# Patient Record
Sex: FEMALE | Race: WHITE | Employment: FULL TIME | URBAN - METROPOLITAN AREA
[De-identification: names, ages, dates, MRNs, and addresses within clinical notes are randomized per-mention and may not be internally consistent; named-entity substitution may affect disease eponyms.]

---

## 2017-02-16 ENCOUNTER — HOSPITAL ENCOUNTER (OUTPATIENT)
Dept: CT IMAGING | Age: 57
Discharge: HOME | End: 2017-02-16
Attending: FAMILY MEDICINE
Payer: COMMERCIAL

## 2017-02-16 DIAGNOSIS — R19.03 RIGHT LOWER QUADRANT ABDOMINAL MASS: ICD-10-CM

## 2017-02-16 PROCEDURE — 74011000258 HC RX REV CODE- 258: Performed by: FAMILY MEDICINE

## 2017-02-16 PROCEDURE — 74011636320 HC RX REV CODE- 636/320: Performed by: FAMILY MEDICINE

## 2017-02-16 PROCEDURE — 74177 CT ABD & PELVIS W/CONTRAST: CPT

## 2017-02-16 RX ORDER — SODIUM CHLORIDE 0.9 % (FLUSH) 0.9 %
10 SYRINGE (ML) INJECTION
Status: COMPLETED | OUTPATIENT
Start: 2017-02-16 | End: 2017-02-16

## 2017-02-16 RX ADMIN — DIATRIZOATE MEGLUMINE AND DIATRIZOATE SODIUM 15 ML: 600; 100 SOLUTION ORAL; RECTAL at 14:00

## 2017-02-16 RX ADMIN — IOVERSOL 100 ML: 741 INJECTION INTRA-ARTERIAL; INTRAVENOUS at 14:00

## 2017-02-16 RX ADMIN — SODIUM CHLORIDE 100 ML: 900 INJECTION, SOLUTION INTRAVENOUS at 14:00

## 2017-02-16 RX ADMIN — Medication 10 ML: at 14:00

## 2017-02-17 NOTE — PROGRESS NOTES
Reported to patient. Likely benign mass. Needs surgical consult. In significant discomfort, so it needs to be SOON.  Has seen Nayeli Ibanez in the past.

## 2017-02-21 PROBLEM — R19.03 ABDOMINAL MASS, RLQ (RIGHT LOWER QUADRANT): Status: ACTIVE | Noted: 2017-02-21

## 2017-02-23 ENCOUNTER — HOSPITAL ENCOUNTER (OUTPATIENT)
Dept: SURGERY | Age: 57
Discharge: HOME | End: 2017-02-23
Payer: COMMERCIAL

## 2017-02-23 VITALS
RESPIRATION RATE: 17 BRPM | DIASTOLIC BLOOD PRESSURE: 61 MMHG | HEART RATE: 65 BPM | TEMPERATURE: 98.5 F | OXYGEN SATURATION: 98 % | SYSTOLIC BLOOD PRESSURE: 111 MMHG | WEIGHT: 127 LBS | BODY MASS INDEX: 21.68 KG/M2 | HEIGHT: 64 IN

## 2017-02-23 LAB
ANION GAP BLD CALC-SCNC: 10 MMOL/L (ref 7–16)
APPEARANCE UR: CLEAR
APTT PPP: 26.4 SEC (ref 23.5–31.7)
BILIRUB UR QL: NEGATIVE
BUN SERPL-MCNC: 15 MG/DL (ref 6–23)
CALCIUM SERPL-MCNC: 9 MG/DL (ref 8.3–10.4)
CHLORIDE SERPL-SCNC: 102 MMOL/L (ref 98–107)
CO2 SERPL-SCNC: 29 MMOL/L (ref 21–32)
COLOR UR: YELLOW
CREAT SERPL-MCNC: 0.75 MG/DL (ref 0.6–1)
ERYTHROCYTE [DISTWIDTH] IN BLOOD BY AUTOMATED COUNT: 12.4 % (ref 11.9–14.6)
GLUCOSE SERPL-MCNC: 64 MG/DL (ref 65–100)
GLUCOSE UR STRIP.AUTO-MCNC: NEGATIVE MG/DL
HCT VFR BLD AUTO: 40.3 % (ref 35.8–46.3)
HGB BLD-MCNC: 13.4 G/DL (ref 11.7–15.4)
HGB UR QL STRIP: NEGATIVE
INR PPP: 1 (ref 0.9–1.2)
KETONES UR QL STRIP.AUTO: NEGATIVE MG/DL
LEUKOCYTE ESTERASE UR QL STRIP.AUTO: NEGATIVE
MCH RBC QN AUTO: 29.8 PG (ref 26.1–32.9)
MCHC RBC AUTO-ENTMCNC: 33.3 G/DL (ref 31.4–35)
MCV RBC AUTO: 89.8 FL (ref 79.6–97.8)
NITRITE UR QL STRIP.AUTO: NEGATIVE
PH UR STRIP: 5.5 [PH] (ref 5–9)
PLATELET # BLD AUTO: 213 K/UL (ref 150–450)
PMV BLD AUTO: 10.1 FL (ref 10.8–14.1)
POTASSIUM SERPL-SCNC: 3.8 MMOL/L (ref 3.5–5.1)
PROT UR STRIP-MCNC: NEGATIVE MG/DL
PROTHROMBIN TIME: 10.4 SEC (ref 9.6–12)
RBC # BLD AUTO: 4.49 M/UL (ref 4.05–5.25)
SODIUM SERPL-SCNC: 141 MMOL/L (ref 136–145)
SP GR UR REFRACTOMETRY: 1.01 (ref 1–1.02)
UROBILINOGEN UR QL STRIP.AUTO: 0.2 EU/DL (ref 0.2–1)
WBC # BLD AUTO: 4.8 K/UL (ref 4.3–11.1)

## 2017-02-23 PROCEDURE — 85730 THROMBOPLASTIN TIME PARTIAL: CPT | Performed by: SURGERY

## 2017-02-23 PROCEDURE — 85027 COMPLETE CBC AUTOMATED: CPT | Performed by: SURGERY

## 2017-02-23 PROCEDURE — 81003 URINALYSIS AUTO W/O SCOPE: CPT | Performed by: SURGERY

## 2017-02-23 PROCEDURE — 85610 PROTHROMBIN TIME: CPT | Performed by: SURGERY

## 2017-02-23 PROCEDURE — 80048 BASIC METABOLIC PNL TOTAL CA: CPT | Performed by: SURGERY

## 2017-02-23 NOTE — PERIOP NOTES
Patient verified name, , and surgery as listed in [facility]. TYPE  CASE:II  Orders per surgeon:  Received  Labs per surgeon: CBC, BMP, PT, PTT; UA  Labs per anesthesia protocol:   none  EKG  :  NA      Patient provided with handouts including guide to surgery , transfusions, pain management and hand hygiene for the family and community. Pt verbalizes understanding of all pre-op instructions . Instructed that family must be present in building at all times. Hibiclens and instructions given per hospital policy. Instructed patient to continue  previous medications as prescribed prior to surgery and  to take the following medications the day of surgery according to anesthesia guidelines : none       Original medication prescription bottles not visualized during patient appointment. Continue all previous medications unless otherwise directed. Instructed patient to hold  the following medications prior to surgery: All supplements- see MAR      Patient verbalized understanding of all instructions and provided all medical/health information to the best of their ability.

## 2017-02-26 ENCOUNTER — ANESTHESIA EVENT (OUTPATIENT)
Dept: SURGERY | Age: 57
DRG: 355 | End: 2017-02-26
Payer: COMMERCIAL

## 2017-02-27 ENCOUNTER — HOSPITAL ENCOUNTER (INPATIENT)
Age: 57
LOS: 2 days | Discharge: HOME | DRG: 355 | End: 2017-03-01
Attending: SURGERY | Admitting: SURGERY
Payer: COMMERCIAL

## 2017-02-27 ENCOUNTER — ANESTHESIA (OUTPATIENT)
Dept: SURGERY | Age: 57
DRG: 355 | End: 2017-02-27
Payer: COMMERCIAL

## 2017-02-27 ENCOUNTER — SURGERY (OUTPATIENT)
Age: 57
End: 2017-02-27

## 2017-02-27 PROBLEM — R19.00 ABDOMINAL MASS: Status: ACTIVE | Noted: 2017-02-27

## 2017-02-27 LAB
ABO + RH BLD: NORMAL
BLOOD GROUP ANTIBODIES SERPL: NORMAL
HCG UR QL: NEGATIVE
SPECIMEN EXP DATE BLD: NORMAL

## 2017-02-27 PROCEDURE — 77030002996 HC SUT SLK J&J -A: Performed by: SURGERY

## 2017-02-27 PROCEDURE — 76210000001 HC OR PH I REC 2.5 TO 3 HR: Performed by: SURGERY

## 2017-02-27 PROCEDURE — 77030011264 HC ELECTRD BLD EXT COVD -A: Performed by: SURGERY

## 2017-02-27 PROCEDURE — 74011250637 HC RX REV CODE- 250/637: Performed by: SURGERY

## 2017-02-27 PROCEDURE — 88305 TISSUE EXAM BY PATHOLOGIST: CPT | Performed by: SURGERY

## 2017-02-27 PROCEDURE — 77030011640 HC PAD GRND REM COVD -A: Performed by: SURGERY

## 2017-02-27 PROCEDURE — 0WBF0ZZ EXCISION OF ABDOMINAL WALL, OPEN APPROACH: ICD-10-PCS | Performed by: SURGERY

## 2017-02-27 PROCEDURE — 0WQF0ZZ REPAIR ABDOMINAL WALL, OPEN APPROACH: ICD-10-PCS | Performed by: SURGERY

## 2017-02-27 PROCEDURE — 76010000162 HC OR TIME 1.5 TO 2 HR INTENSV-TIER 1: Performed by: SURGERY

## 2017-02-27 PROCEDURE — 76060000034 HC ANESTHESIA 1.5 TO 2 HR: Performed by: SURGERY

## 2017-02-27 PROCEDURE — 74011250636 HC RX REV CODE- 250/636

## 2017-02-27 PROCEDURE — 74011000250 HC RX REV CODE- 250

## 2017-02-27 PROCEDURE — 74011250636 HC RX REV CODE- 250/636: Performed by: SURGERY

## 2017-02-27 PROCEDURE — 77030032490 HC SLV COMPR SCD KNE COVD -B: Performed by: SURGERY

## 2017-02-27 PROCEDURE — 81025 URINE PREGNANCY TEST: CPT

## 2017-02-27 PROCEDURE — 77030008467 HC STPLR SKN COVD -B: Performed by: SURGERY

## 2017-02-27 PROCEDURE — 77030018836 HC SOL IRR NACL ICUM -A: Performed by: SURGERY

## 2017-02-27 PROCEDURE — 65270000029 HC RM PRIVATE

## 2017-02-27 PROCEDURE — 74011250637 HC RX REV CODE- 250/637: Performed by: ANESTHESIOLOGY

## 2017-02-27 PROCEDURE — 77030002966 HC SUT PDS J&J -A: Performed by: SURGERY

## 2017-02-27 PROCEDURE — 77030034850: Performed by: SURGERY

## 2017-02-27 PROCEDURE — 86900 BLOOD TYPING SEROLOGIC ABO: CPT | Performed by: ANESTHESIOLOGY

## 2017-02-27 PROCEDURE — 74011250636 HC RX REV CODE- 250/636: Performed by: ANESTHESIOLOGY

## 2017-02-27 PROCEDURE — 77030003029 HC SUT VCRL J&J -B: Performed by: SURGERY

## 2017-02-27 RX ORDER — SODIUM CHLORIDE, SODIUM LACTATE, POTASSIUM CHLORIDE, CALCIUM CHLORIDE 600; 310; 30; 20 MG/100ML; MG/100ML; MG/100ML; MG/100ML
100 INJECTION, SOLUTION INTRAVENOUS CONTINUOUS
Status: DISCONTINUED | OUTPATIENT
Start: 2017-02-27 | End: 2017-02-27 | Stop reason: HOSPADM

## 2017-02-27 RX ORDER — LIDOCAINE HYDROCHLORIDE 20 MG/ML
INJECTION, SOLUTION EPIDURAL; INFILTRATION; INTRACAUDAL; PERINEURAL AS NEEDED
Status: DISCONTINUED | OUTPATIENT
Start: 2017-02-27 | End: 2017-02-27 | Stop reason: HOSPADM

## 2017-02-27 RX ORDER — SODIUM CHLORIDE 0.9 % (FLUSH) 0.9 %
5-10 SYRINGE (ML) INJECTION AS NEEDED
Status: DISCONTINUED | OUTPATIENT
Start: 2017-02-27 | End: 2017-03-01 | Stop reason: HOSPADM

## 2017-02-27 RX ORDER — HYDROMORPHONE HYDROCHLORIDE 2 MG/ML
INJECTION, SOLUTION INTRAMUSCULAR; INTRAVENOUS; SUBCUTANEOUS AS NEEDED
Status: DISCONTINUED | OUTPATIENT
Start: 2017-02-27 | End: 2017-02-27 | Stop reason: HOSPADM

## 2017-02-27 RX ORDER — FENTANYL CITRATE 50 UG/ML
25 INJECTION, SOLUTION INTRAMUSCULAR; INTRAVENOUS ONCE
Status: DISCONTINUED | OUTPATIENT
Start: 2017-02-27 | End: 2017-02-27 | Stop reason: HOSPADM

## 2017-02-27 RX ORDER — HYDROMORPHONE HYDROCHLORIDE 2 MG/ML
0.5 INJECTION, SOLUTION INTRAMUSCULAR; INTRAVENOUS; SUBCUTANEOUS
Status: DISCONTINUED | OUTPATIENT
Start: 2017-02-27 | End: 2017-02-27 | Stop reason: HOSPADM

## 2017-02-27 RX ORDER — ZOLPIDEM TARTRATE 5 MG/1
5 TABLET ORAL
Status: DISCONTINUED | OUTPATIENT
Start: 2017-02-27 | End: 2017-03-01 | Stop reason: HOSPADM

## 2017-02-27 RX ORDER — ENOXAPARIN SODIUM 100 MG/ML
40 INJECTION SUBCUTANEOUS EVERY 24 HOURS
Status: DISCONTINUED | OUTPATIENT
Start: 2017-02-28 | End: 2017-03-01 | Stop reason: HOSPADM

## 2017-02-27 RX ORDER — ONDANSETRON 2 MG/ML
INJECTION INTRAMUSCULAR; INTRAVENOUS AS NEEDED
Status: DISCONTINUED | OUTPATIENT
Start: 2017-02-27 | End: 2017-02-27 | Stop reason: HOSPADM

## 2017-02-27 RX ORDER — SODIUM CHLORIDE 0.9 % (FLUSH) 0.9 %
5-10 SYRINGE (ML) INJECTION AS NEEDED
Status: DISCONTINUED | OUTPATIENT
Start: 2017-02-27 | End: 2017-02-27 | Stop reason: HOSPADM

## 2017-02-27 RX ORDER — SODIUM CHLORIDE 0.9 % (FLUSH) 0.9 %
5-10 SYRINGE (ML) INJECTION EVERY 8 HOURS
Status: DISCONTINUED | OUTPATIENT
Start: 2017-02-27 | End: 2017-02-27 | Stop reason: HOSPADM

## 2017-02-27 RX ORDER — ONDANSETRON 2 MG/ML
4 INJECTION INTRAMUSCULAR; INTRAVENOUS
Status: DISCONTINUED | OUTPATIENT
Start: 2017-02-27 | End: 2017-03-01 | Stop reason: HOSPADM

## 2017-02-27 RX ORDER — GLYCOPYRROLATE 0.2 MG/ML
INJECTION INTRAMUSCULAR; INTRAVENOUS AS NEEDED
Status: DISCONTINUED | OUTPATIENT
Start: 2017-02-27 | End: 2017-02-27 | Stop reason: HOSPADM

## 2017-02-27 RX ORDER — PROPOFOL 10 MG/ML
INJECTION, EMULSION INTRAVENOUS AS NEEDED
Status: DISCONTINUED | OUTPATIENT
Start: 2017-02-27 | End: 2017-02-27 | Stop reason: HOSPADM

## 2017-02-27 RX ORDER — HYDROMORPHONE HYDROCHLORIDE 1 MG/ML
1 INJECTION, SOLUTION INTRAMUSCULAR; INTRAVENOUS; SUBCUTANEOUS
Status: DISCONTINUED | OUTPATIENT
Start: 2017-02-27 | End: 2017-03-01 | Stop reason: HOSPADM

## 2017-02-27 RX ORDER — ROCURONIUM BROMIDE 10 MG/ML
INJECTION, SOLUTION INTRAVENOUS AS NEEDED
Status: DISCONTINUED | OUTPATIENT
Start: 2017-02-27 | End: 2017-02-27 | Stop reason: HOSPADM

## 2017-02-27 RX ORDER — SODIUM CHLORIDE, SODIUM LACTATE, POTASSIUM CHLORIDE, CALCIUM CHLORIDE 600; 310; 30; 20 MG/100ML; MG/100ML; MG/100ML; MG/100ML
INJECTION, SOLUTION INTRAVENOUS
Status: DISCONTINUED | OUTPATIENT
Start: 2017-02-27 | End: 2017-02-27 | Stop reason: HOSPADM

## 2017-02-27 RX ORDER — LIDOCAINE HYDROCHLORIDE 10 MG/ML
0.1 INJECTION INFILTRATION; PERINEURAL AS NEEDED
Status: DISCONTINUED | OUTPATIENT
Start: 2017-02-27 | End: 2017-02-27 | Stop reason: HOSPADM

## 2017-02-27 RX ORDER — MIDAZOLAM HYDROCHLORIDE 1 MG/ML
2 INJECTION, SOLUTION INTRAMUSCULAR; INTRAVENOUS
Status: DISCONTINUED | OUTPATIENT
Start: 2017-02-27 | End: 2017-02-27 | Stop reason: HOSPADM

## 2017-02-27 RX ORDER — ATORVASTATIN CALCIUM 10 MG/1
10 TABLET, FILM COATED ORAL
Status: DISCONTINUED | OUTPATIENT
Start: 2017-02-27 | End: 2017-03-01 | Stop reason: HOSPADM

## 2017-02-27 RX ORDER — ESCITALOPRAM OXALATE 10 MG/1
20 TABLET ORAL DAILY
Status: DISCONTINUED | OUTPATIENT
Start: 2017-02-28 | End: 2017-03-01 | Stop reason: HOSPADM

## 2017-02-27 RX ORDER — DEXAMETHASONE SODIUM PHOSPHATE 4 MG/ML
INJECTION, SOLUTION INTRA-ARTICULAR; INTRALESIONAL; INTRAMUSCULAR; INTRAVENOUS; SOFT TISSUE AS NEEDED
Status: DISCONTINUED | OUTPATIENT
Start: 2017-02-27 | End: 2017-02-27 | Stop reason: HOSPADM

## 2017-02-27 RX ORDER — OXYCODONE HYDROCHLORIDE 5 MG/1
5 TABLET ORAL
Status: DISCONTINUED | OUTPATIENT
Start: 2017-02-27 | End: 2017-02-27 | Stop reason: HOSPADM

## 2017-02-27 RX ORDER — SODIUM CHLORIDE 0.9 % (FLUSH) 0.9 %
5-10 SYRINGE (ML) INJECTION EVERY 8 HOURS
Status: DISCONTINUED | OUTPATIENT
Start: 2017-02-27 | End: 2017-03-01 | Stop reason: HOSPADM

## 2017-02-27 RX ORDER — NEOSTIGMINE METHYLSULFATE 1 MG/ML
INJECTION INTRAVENOUS AS NEEDED
Status: DISCONTINUED | OUTPATIENT
Start: 2017-02-27 | End: 2017-02-27 | Stop reason: HOSPADM

## 2017-02-27 RX ORDER — OXYCODONE AND ACETAMINOPHEN 5; 325 MG/1; MG/1
1 TABLET ORAL AS NEEDED
Status: DISCONTINUED | OUTPATIENT
Start: 2017-02-27 | End: 2017-02-27 | Stop reason: HOSPADM

## 2017-02-27 RX ORDER — CEFAZOLIN SODIUM IN 0.9 % NACL 2 G/50 ML
2 INTRAVENOUS SOLUTION, PIGGYBACK (ML) INTRAVENOUS EVERY 8 HOURS
Status: COMPLETED | OUTPATIENT
Start: 2017-02-27 | End: 2017-02-28

## 2017-02-27 RX ORDER — DOCUSATE SODIUM 100 MG/1
100 CAPSULE, LIQUID FILLED ORAL 2 TIMES DAILY
Status: DISCONTINUED | OUTPATIENT
Start: 2017-02-27 | End: 2017-03-01 | Stop reason: HOSPADM

## 2017-02-27 RX ORDER — MIDAZOLAM HYDROCHLORIDE 1 MG/ML
2 INJECTION, SOLUTION INTRAMUSCULAR; INTRAVENOUS ONCE
Status: DISCONTINUED | OUTPATIENT
Start: 2017-02-27 | End: 2017-02-27 | Stop reason: HOSPADM

## 2017-02-27 RX ORDER — SODIUM CHLORIDE 9 MG/ML
50 INJECTION, SOLUTION INTRAVENOUS CONTINUOUS
Status: DISCONTINUED | OUTPATIENT
Start: 2017-02-27 | End: 2017-02-27 | Stop reason: HOSPADM

## 2017-02-27 RX ORDER — FAMOTIDINE 20 MG/1
20 TABLET, FILM COATED ORAL ONCE
Status: COMPLETED | OUTPATIENT
Start: 2017-02-27 | End: 2017-02-27

## 2017-02-27 RX ORDER — HYDROCODONE BITARTRATE AND ACETAMINOPHEN 7.5; 325 MG/1; MG/1
1 TABLET ORAL
Status: DISCONTINUED | OUTPATIENT
Start: 2017-02-27 | End: 2017-03-01 | Stop reason: HOSPADM

## 2017-02-27 RX ORDER — CEFAZOLIN SODIUM IN 0.9 % NACL 2 G/50 ML
2 INTRAVENOUS SOLUTION, PIGGYBACK (ML) INTRAVENOUS ONCE
Status: COMPLETED | OUTPATIENT
Start: 2017-02-27 | End: 2017-02-27

## 2017-02-27 RX ORDER — SODIUM CHLORIDE, SODIUM LACTATE, POTASSIUM CHLORIDE, CALCIUM CHLORIDE 600; 310; 30; 20 MG/100ML; MG/100ML; MG/100ML; MG/100ML
25 INJECTION, SOLUTION INTRAVENOUS CONTINUOUS
Status: DISPENSED | OUTPATIENT
Start: 2017-02-27 | End: 2017-02-28

## 2017-02-27 RX ORDER — DIPHENHYDRAMINE HYDROCHLORIDE 50 MG/ML
12.5 INJECTION, SOLUTION INTRAMUSCULAR; INTRAVENOUS ONCE
Status: DISCONTINUED | OUTPATIENT
Start: 2017-02-27 | End: 2017-02-27 | Stop reason: HOSPADM

## 2017-02-27 RX ADMIN — HYDROMORPHONE HYDROCHLORIDE 1 MG: 1 INJECTION, SOLUTION INTRAMUSCULAR; INTRAVENOUS; SUBCUTANEOUS at 20:49

## 2017-02-27 RX ADMIN — DEXAMETHASONE SODIUM PHOSPHATE 4 MG: 4 INJECTION, SOLUTION INTRA-ARTICULAR; INTRALESIONAL; INTRAMUSCULAR; INTRAVENOUS; SOFT TISSUE at 13:37

## 2017-02-27 RX ADMIN — SODIUM CHLORIDE, SODIUM LACTATE, POTASSIUM CHLORIDE, AND CALCIUM CHLORIDE 75 ML/HR: 600; 310; 30; 20 INJECTION, SOLUTION INTRAVENOUS at 18:00

## 2017-02-27 RX ADMIN — CEFAZOLIN 2 G: 1 INJECTION, POWDER, FOR SOLUTION INTRAMUSCULAR; INTRAVENOUS; PARENTERAL at 20:36

## 2017-02-27 RX ADMIN — ROCURONIUM BROMIDE 50 MG: 10 INJECTION, SOLUTION INTRAVENOUS at 13:10

## 2017-02-27 RX ADMIN — ONDANSETRON 4 MG: 2 INJECTION INTRAMUSCULAR; INTRAVENOUS at 14:24

## 2017-02-27 RX ADMIN — FAMOTIDINE 20 MG: 20 TABLET ORAL at 09:16

## 2017-02-27 RX ADMIN — LIDOCAINE HYDROCHLORIDE 100 MG: 20 INJECTION, SOLUTION EPIDURAL; INFILTRATION; INTRACAUDAL; PERINEURAL at 13:10

## 2017-02-27 RX ADMIN — GLYCOPYRROLATE 0.4 MG: 0.2 INJECTION INTRAMUSCULAR; INTRAVENOUS at 14:30

## 2017-02-27 RX ADMIN — PROPOFOL 200 MG: 10 INJECTION, EMULSION INTRAVENOUS at 13:10

## 2017-02-27 RX ADMIN — HYDROMORPHONE HYDROCHLORIDE 0.2 MG: 2 INJECTION, SOLUTION INTRAMUSCULAR; INTRAVENOUS; SUBCUTANEOUS at 14:37

## 2017-02-27 RX ADMIN — ROCURONIUM BROMIDE 10 MG: 10 INJECTION, SOLUTION INTRAVENOUS at 13:49

## 2017-02-27 RX ADMIN — CEFAZOLIN 2 G: 1 INJECTION, POWDER, FOR SOLUTION INTRAMUSCULAR; INTRAVENOUS; PARENTERAL at 13:20

## 2017-02-27 RX ADMIN — HYDROMORPHONE HYDROCHLORIDE 0.2 MG: 2 INJECTION, SOLUTION INTRAMUSCULAR; INTRAVENOUS; SUBCUTANEOUS at 14:52

## 2017-02-27 RX ADMIN — MIDAZOLAM HYDROCHLORIDE 2 MG: 1 INJECTION, SOLUTION INTRAMUSCULAR; INTRAVENOUS at 11:33

## 2017-02-27 RX ADMIN — NEOSTIGMINE METHYLSULFATE 3 MG: 1 INJECTION INTRAVENOUS at 14:30

## 2017-02-27 RX ADMIN — HYDROMORPHONE HYDROCHLORIDE 1.2 MG: 2 INJECTION, SOLUTION INTRAMUSCULAR; INTRAVENOUS; SUBCUTANEOUS at 13:10

## 2017-02-27 RX ADMIN — HYDROMORPHONE HYDROCHLORIDE 0.2 MG: 2 INJECTION, SOLUTION INTRAMUSCULAR; INTRAVENOUS; SUBCUTANEOUS at 14:29

## 2017-02-27 RX ADMIN — OXYCODONE HYDROCHLORIDE 5 MG: 5 TABLET ORAL at 16:17

## 2017-02-27 RX ADMIN — DOCUSATE SODIUM 100 MG: 100 CAPSULE, LIQUID FILLED ORAL at 18:08

## 2017-02-27 RX ADMIN — SODIUM CHLORIDE, SODIUM LACTATE, POTASSIUM CHLORIDE, CALCIUM CHLORIDE: 600; 310; 30; 20 INJECTION, SOLUTION INTRAVENOUS at 13:12

## 2017-02-27 RX ADMIN — SODIUM CHLORIDE, SODIUM LACTATE, POTASSIUM CHLORIDE, AND CALCIUM CHLORIDE 100 ML/HR: 600; 310; 30; 20 INJECTION, SOLUTION INTRAVENOUS at 09:16

## 2017-02-27 RX ADMIN — Medication 10 ML: at 18:00

## 2017-02-27 RX ADMIN — HYDROCODONE BITARTRATE AND ACETAMINOPHEN 1 TABLET: 7.5; 325 TABLET ORAL at 18:08

## 2017-02-27 RX ADMIN — ATORVASTATIN CALCIUM 10 MG: 10 TABLET, FILM COATED ORAL at 20:49

## 2017-02-27 RX ADMIN — HYDROMORPHONE HYDROCHLORIDE 0.2 MG: 2 INJECTION, SOLUTION INTRAMUSCULAR; INTRAVENOUS; SUBCUTANEOUS at 14:58

## 2017-02-27 NOTE — ANESTHESIA POSTPROCEDURE EVALUATION
Post-Anesthesia Evaluation and Assessment    Patient: Radha Sierra MRN: [id number]  SSN: [id number]    YOB: 1960  Age: 55 y.o. Sex: female       Cardiovascular Function/Vital Signs  Visit Vitals    BP 97/53    Pulse 67    Temp 36.8 °C (98.2 °F)    Resp 16    Ht 5' 4\" (1.626 m)    Wt 57.6 kg (127 lb)    SpO2 98%    BMI 21.8 kg/m2       Patient is status post general anesthesia for Procedure(s):  LAPAROTOMY EXPLORATORY/ ABDOMINAL MASS EXCISION/ UMBILICAL HERNIA REPAIR. Nausea/Vomiting: None    Postoperative hydration reviewed and adequate. Pain:  Pain Scale 1: Visual (02/27/17 1502)  Pain Intensity 1: 0 (02/27/17 1502)   Managed    Neurological Status:   Neuro (WDL): Within Defined Limits (02/27/17 1502)   At baseline    Mental Status and Level of Consciousness: Arousable    Pulmonary Status:   O2 Device: Nasal cannula (02/27/17 1502)   Adequate oxygenation and airway patent    Complications related to anesthesia: None    Post-anesthesia assessment completed.  No concerns    Signed By: Osmany Villavicencio MD     February 27, 2017

## 2017-02-27 NOTE — ANESTHESIA PREPROCEDURE EVALUATION
Anesthetic History   No history of anesthetic complications            Review of Systems / Medical History  Patient summary reviewed and pertinent labs reviewed    Pulmonary  Within defined limits                 Neuro/Psych   Within defined limits           Cardiovascular              Hyperlipidemia    Exercise tolerance: >4 METS     GI/Hepatic/Renal  Within defined limits              Endo/Other  Within defined limits           Other Findings   Comments: Abdominal mass  Insomnia  Anxiety  Cervical fusion--2 level           Physical Exam    Airway  Mallampati: I  TM Distance: 4 - 6 cm  Neck ROM: normal range of motion   Mouth opening: Normal     Cardiovascular  Regular rate and rhythm,  S1 and S2 normal,  no murmur, click, rub, or gallop  Rhythm: regular  Rate: normal         Dental    Dentition: Bridges  Comments: Upper bridge   Pulmonary  Breath sounds clear to auscultation               Abdominal  GI exam deferred       Other Findings            Anesthetic Plan    ASA: 2  Anesthesia type: general          Induction: Intravenous  Anesthetic plan and risks discussed with: Patient

## 2017-02-27 NOTE — PERIOP NOTES
TRANSFER - OUT REPORT:    Verbal report given to Yudith Flores on [facility]  being transferred to room 217 for routine post - op       Report consisted of patients Situation, Background, Assessment and   Recommendations(SBAR). Information from the following report(s) SBAR, Procedure Summary, Intake/Output, MAR and Cardiac Rhythm SR was reviewed with the receiving nurse. Lines:   Peripheral IV 02/27/17 Left Hand (Active)   Site Assessment Clean, dry, & intact 2/27/2017  4:00 PM   Phlebitis Assessment 0 2/27/2017  4:00 PM   Infiltration Assessment 0 2/27/2017  4:00 PM   Dressing Status Clean, dry, & intact 2/27/2017  4:00 PM   Dressing Type Transparent;Tape 2/27/2017  4:00 PM   Hub Color/Line Status Almita Aneudy 2/27/2017  4:00 PM        Opportunity for questions and clarification was provided. Patient transported with:   O2 @ 2 liters    VTE prophylaxis orders have been written for [facility]. Patient and family given floor number and nurses name. Family updated re: pt status after security code verified.

## 2017-02-27 NOTE — IP AVS SNAPSHOT
[address] 
[phone] Patient: Flaco Porras MRN: [id number] :1960 You are allergic to the following Allergen Reactions Mobic (Meloxicam) Other (comments) Angio edema Tongue swells Biaxin (Clarithromycin) Unknown (comments)  
 heartburns Recent Documentation Height  
  
  
  
  
  
 1.626 m Emergency Contacts Name Discharge Info Relation Home Work Mobile Christophe Paris  Spouse [3] [phone] About your hospitalization You were admitted on:  2017 You last received care in the:  [facility] 2 SURGICAL You were discharged on:  2017 Unit phone number:  [phone] Why you were hospitalized Your primary diagnosis was:  Not on File Your diagnoses also included:  Abdominal Mass Providers Seen During Your Hospitalizations Provider Role Specialty Primary office phone Demetra Santiago MD Attending Provider General Surgery [phone] Your Primary Care Physician (PCP) Primary Care Physician Office Phone Office Fax Viktoria Burnett [phone] Follow-up Information Follow up With Details Comments Contact Info Demetra Santiago MD On 3/7/2017 2:30pm David Weldon [address] [address] 
[phone] Rhett Onofre DO   [address] [facility] [address] 
[phone] Your Appointments 2017  2:30 PM EST Global Post Op with Demetra Santiago MD  
[place] [facility] - MAIN ([facility] MAIN) [address] [place] [address]  
[phone] 2017  9:30 AM EST  
LAB with [facility] [address] ([address] [place] [address]) [address] [facility] [id number] [phone]  2017  8:50 AM EDT  
 ESTABLISHED PATIENT with Gildardo DO [facility] ([facility]) [address] [place] [address] [phone] Current Discharge Medication List  
  
START taking these medications Dose & Instructions Dispensing Information Comments Morning Noon Evening Bedtime HYDROcodone-acetaminophen 7.5-325 mg per tablet Commonly known as:  [address] M Notes to Patient:  As needed Dose:  1 Tab Take 1 Tab by mouth every four (4) hours as needed. Max Daily Amount: 6 Tabs. Quantity:  40 Tab Refills:  0 CONTINUE these medications which have CHANGED Dose & Instructions Dispensing Information Comments Morning Noon Evening Bedtime  
 atorvastatin 10 mg tablet Commonly known as:  LIPITOR What changed:  when to take this Dose:  10 mg Take 1 Tab by mouth daily. Quantity:  30 Tab Refills:  11 CONTINUE these medications which have NOT CHANGED Dose & Instructions Dispensing Information Comments Morning Noon Evening Bedtime  
 escitalopram oxalate 20 mg tablet Commonly known as:  LEXAPRO  
   
 take 1 tablet by mouth once daily Quantity:  30 Tab Refills:  11 FISH OIL 1,000 mg Cap Generic drug:  omega-3 fatty acids-vitamin e Dose:  1 Cap Take 1 Cap by mouth daily. Last dose 2/22/17 Refills:  0 MULTI-VITAMIN PO Dose:  1 Tab Take 1 Tab by mouth nightly. Last dose 2/22/17 Refills:  0  
     
  
   
   
   
  
 VITAMIN D3 1,000 unit tablet Generic drug:  cholecalciferol Take  by mouth daily. Last dose 2/22/17 Refills:  0  
     
  
   
   
   
  
 zolpidem 5 mg tablet Commonly known as:  AMBIEN Dose:  5 mg Take 1 Tab by mouth nightly as needed for up to 30 days. Max Daily Amount: 5 mg. Quantity:  90 Tab Refills:  1 STOP taking these medications   
 traMADol 50 mg tablet Commonly known as:  ULTRAM  
   
  
  
  
Where to Get Your Medications Information on where to get these meds will be given to you by the nurse or doctor. ! Ask your nurse or doctor about these medications HYDROcodone-acetaminophen 7.5-325 mg per tablet Discharge Instructions MD Instructions: Follow-up with Dr. Marjory Habermann  in 1 week. Keep incision/staples clean and dry, may remain uncovered. Do not apply lotions, creams or ointments to incisions/damaris. Linford Gold will be removed at follow-up appointment. Diet - as tolerated - Regular diet. Activity - ambulate - as tolerated - no heavy lifting >10lb. May shower - no tub baths or soaking. No driving while taking narcotics. Do not drink alcohol while taking narcotics. Resume other home medications. If problems or questions arise, please call our office at [phone]. DISCHARGE SUMMARY from Nurse The following personal items are in your possession at time of discharge: 
 
Dental Appliances: None Visual Aid: Glasses Home Medications: None Jewelry: Ring Clothing: Footwear, Undergarments, Pants, Shirt Other Valuables: None PATIENT INSTRUCTIONS: 
 
After general anesthesia or intravenous sedation, for 24 hours or while taking prescription Narcotics: · Limit your activities · Do not drive and operate hazardous machinery · Do not make important personal or business decisions · Do  not drink alcoholic beverages · If you have not urinated within 8 hours after discharge, please contact your surgeon on call. Report the following to your surgeon: 
· Excessive pain, swelling, redness or odor of or around the surgical area · Temperature over 100.5 · Nausea and vomiting lasting longer than 4 hours or if unable to take medications · Any signs of decreased circulation or nerve impairment to extremity: change in color, persistent  numbness, tingling, coldness or increase pain · Any questions What to do at Home: 
Recommended activity: Activity as tolerated, per MD instructions If you experience any of the following symptoms fever > 100.5, nausea, vomiting, abdominal pain, chest pain, shortness of breath please follow up with MD. 
 
 
*  Please give a list of your current medications to your Primary Care Provider. *  Please update this list whenever your medications are discontinued, doses are 
    changed, or new medications (including over-the-counter products) are added. *  Please carry medication information at all times in case of emergency situations. These are general instructions for a healthy lifestyle: No smoking/ No tobacco products/ Avoid exposure to second hand smoke Surgeon General's Warning:  Quitting smoking now greatly reduces serious risk to your health. Obesity, smoking, and sedentary lifestyle greatly increases your risk for illness A healthy diet, regular physical exercise & weight monitoring are important for maintaining a healthy lifestyle You may be retaining fluid if you have a history of heart failure or if you experience any of the following symptoms:  Weight gain of 3 pounds or more overnight or 5 pounds in a week, increased swelling in our hands or feet or shortness of breath while lying flat in bed. Please call your doctor as soon as you notice any of these symptoms; do not wait until your next office visit. Recognize signs and symptoms of STROKE: 
 
F-face looks uneven A-arms unable to move or move unevenly S-speech slurred or non-existent T-time-call 911 as soon as signs and symptoms begin-DO NOT go Back to bed or wait to see if you get better-TIME IS BRAIN. Warning Signs of HEART ATTACK Call 911 if you have these symptoms: 
? Chest discomfort.  Most heart attacks involve discomfort in the center of the chest that lasts more than a few minutes, or that goes away and comes back. It can feel like uncomfortable pressure, squeezing, fullness, or pain. ? Discomfort in other areas of the upper body. Symptoms can include pain or discomfort in one or both arms, the back, neck, jaw, or stomach. ? Shortness of breath with or without chest discomfort. ? Other signs may include breaking out in a cold sweat, nausea, or lightheadedness. Don't wait more than five minutes to call [address]! Fast action can save your life. Calling 911 is almost always the fastest way to get lifesaving treatment. Emergency Medical Services staff can begin treatment when they arrive  up to an hour sooner than if someone gets to the hospital by car. The discharge information has been reviewed with the patient. The patient verbalized understanding. Discharge medications reviewed with the patient and appropriate educational materials and side effects teaching were provided. Surgery: What to Expect at [facility] Your Recovery This care sheet gives you a general idea about how long it will take for you to recover from your surgery. But each person recovers at a different pace. How can you care for yourself at home? Activity · Allow your body to heal. Don't move quickly or lift anything heavy until you are feeling better. · Rest when you feel tired. · Your doctor may give you specific instructions on when you can do your normal activities again, such as driving and going back to work. · Be active. Walking is a good choice. Diet · You can eat your normal diet when you feel well. If your stomach is upset, try bland, low-fat foods like plain rice, broiled chicken, toast, and yogurt. · If your bowel movements are not regular right after surgery, try to avoid constipation and straining. Drink plenty of water. Your doctor may suggest fiber, a stool softener, or a mild laxative. Medicines · Your doctor will tell you if and when you can restart your medicines. He or she will also give you instructions about taking any new medicines. · If you take blood thinners, such as warfarin (Coumadin), clopidogrel (Plavix), or aspirin, be sure to talk to your doctor. He or she will tell you if and when to start taking those medicines again. Make sure that you understand exactly what your doctor wants you to do. · Be safe with medicines. Read and follow all instructions on the label. ¨ If the doctor gave you a prescription medicine for pain, take it as prescribed. ¨ If you are not taking a prescription pain medicine, ask your doctor if you can take an over-the-counter medicine. Incision care · You will have a dressing over the cut (incision). A dressing helps the incision heal and protects it. Your doctor will tell you how to take care of this. · If you have strips of tape on the cut the doctor made, leave the tape on for a week or until it falls off. · If you had stitches, your doctor will tell you when to come back to have them removed. · If you have skin adhesive on the cut, leave it on until it falls off. Skin adhesive is also called liquid stitches. · Change the bandage every day. · Wash the area daily with warm, soapy water, and pat it dry. Don't use hydrogen peroxide or alcohol. They can slow healing. · You may cover the area with a gauze bandage if it oozes fluid or rubs against clothing. · You may shower 24 to 48 hours after surgery. Pat the incision dry. Don't swim or take a bath for the first 2 weeks, or until your doctor tells you it is okay. Follow-up care is a key part of your treatment and safety. Be sure to make and go to all appointments, and call your doctor if you are having problems. It's also a good idea to know your test results and keep a list of the medicines you take. When should you call for help? Call 911 anytime you think you may need emergency care.  For example, call if: 
· You passed out (lost consciousness). · You have severe trouble breathing. · You have sudden chest pain and shortness of breath, or you cough up blood. Call your doctor now or seek immediate medical care if: 
· You have pain that does not get better after you take pain medicine. · You have loose stitches, or your incision comes open. · You are bleeding through your dressing. A small amount of blood is normal. 
· You have signs of infection, such as: 
¨ Increased pain, swelling, warmth, or redness. ¨ Red streaks leading from the incision. ¨ Pus draining from the incision. ¨ A fever. · You have symptoms of a blood clot in your arm or leg (called a deep vein thrombosis). These may include: 
¨ Pain in your calf, back of the knee, thigh, or groin. ¨ Redness and swelling in the arm, leg, or groin. Watch closely for any changes in your health, and be sure to contact your doctor if: 
· You do not have a bowel movement after taking a laxative. Where can you learn more? Go to http://yevgeniy-david.info/. Enter P099 in the search box to learn more about \"Surgery: What to Expect at Home. \" Current as of: August 14, 2016 Content Version: 11.1 © 5832-6668 [employer]. Care instructions adapted under license by [employer] (which disclaims liability or warranty for this information). If you have questions about a medical condition or this instruction, always ask your healthcare professional. [place] [address] any warranty or liability for your use of this information. Discharge Orders None [facility] Announcement We are excited to announce that we are making your provider's discharge notes available to you in [employer]Parts. You will see these notes when they are completed and signed by the physician that discharged you from your recent hospital stay.   If you have any questions or concerns about any information you see in [employer], please call the Health Information Department where you were seen or reach out to your Primary Care Provider for more information about your plan of care. Introducing [facility]! Dear Regis Abarca: 
Thank you for requesting a [employer] account. Our records indicate that you already have an active [employer] account. You can access your account anytime at [email or web address] Did you know that you can access your hospital and ER discharge instructions at any time in HealthSouk? You can also review all of your test results from your hospital stay or ER visit. Additional Information If you have questions, please visit the Frequently Asked Questions section of the [employer] website at [email or web address] Remember, HealthSouk is NOT to be used for urgent needs. For medical emergencies, dial 911. Now available from your iPhone and Android! General Information Please provide this summary of care documentation to your next provider. Patient Signature:  ____________________________________________________________ Date:  ____________________________________________________________  
  
Wandy Gallardo Provider Signature:  ____________________________________________________________ Date:  ____________________________________________________________

## 2017-02-27 NOTE — PROGRESS NOTES
TRANSFER - IN REPORT:    Verbal report received from Chrisotpher Ascencio RN on [employer]  being received from PACU for routine post - op      Report consisted of patients Situation, Background, Assessment and   Recommendations(SBAR). Information from the following report(s) SBAR, Kardex, OR Summary, Procedure Summary, Intake/Output, MAR and Recent Results was reviewed with the receiving nurse. Opportunity for questions and clarification was provided. Assessment completed upon patients arrival to unit and care assumed.

## 2017-02-27 NOTE — BRIEF OP NOTE
BRIEF OPERATIVE NOTE    Date of Procedure: 2/27/2017   Preoperative Diagnosis: Abdominal mass, RLQ (right lower quadrant) [R19.03]  Postoperative Diagnosis: Abdominal mass, RLQ (right lower quadrant) [B49.94], umbilical hernia    Procedure(s):  LAPAROTOMY EXPLORATORY/ ABDOMINAL MASS EXCISION, 10 cm/ UMBILICAL HERNIA REPAIR  Surgeon(s) and Role:     * Hilda Campos MD - Primary            Surgical Staff:  Circ-1: Arben Coronado RN  Scrub Tech-1: Zaynab Veliz  Scrub Tech-2: Narcisa Aldana  Event Time In   Incision Start  1:36 PM   Incision Close  2:46 PM     Anesthesia: General   Estimated Blood Loss: 50 cc  Specimens:   ID Type Source Tests Collected by Time Destination   1 : RIGHT LOWER QUADRANT MASS Fresh Taisha Pan MD 2/27/2017  2:26 PM Pathology      Findings: cystic mass in right paracolic gutter  Complications: none  Implants: * No implants in log *

## 2017-02-27 NOTE — PROGRESS NOTES
Dual Skin Assessment    Skin assessment completed with Troy Haque RN. No abnormalities noted. Wound Abdomen Mid (Active)   DRESSING STATUS Changed per order 2/27/2017  6:10 PM   DRESSING TYPE 4 x 4;Special tape (comment); Staples 2/27/2017  1:00 PM        Cindy Gupta RN    2/27/2017 6:15 PM

## 2017-02-27 NOTE — H&P
[facility]  [facility]. [address] [place] [address]  [phone]      Patient: Debra Soriano  : 1960     HPI:  Debra Soriano is a 55 y.o. female who is seen for right lower quadrant mass. She developed right lower quadrant pain over a month with abdominal bloating, urge to go to bathroom. Now she can actually palpate the mass. Interestingly, she has a mass in similar location removed in early . She was told \"endometrial tissue\". She had appendectomy in young age and she had laparoscopic lysis of adhesion in . She is otherwise in good health. She eats drinks well. She denies weight loss. She is non smoker, non drinker. Father had history of multiple myeloma.      Patient's past medical, surgical, family and social histories were reviewed as noted here and below.           Past Medical History   Diagnosis Date    Anxiety disorder 2015    Cervical disc disorder 2015    Chronic pain         neck    CRP elevated 2015    Depression 2015    Fracture 8/10/15       left foot    Insomnia         at times    Psychiatric disorder         mild depression, takes meds    Pure hypercholesterolemia 2015             Current Outpatient Prescriptions   Medication Sig Dispense Refill    traMADol (ULTRAM) 50 mg tablet Take 1 Tab by mouth every six (6) hours as needed for Pain. Max Daily Amount: 200 mg. Indications: Pain 30 Tab 0    traMADol (ULTRAM) 50 mg tablet Take 1 Tab by mouth every six (6) hours as needed for Pain. Max Daily Amount: 200 mg. 30 Tab 2    fluticasone (FLONASE) 50 mcg/actuation nasal spray 2 Sprays by Both Nostrils route daily. 1 Bottle 2    escitalopram oxalate (LEXAPRO) 20 mg tablet take 1 tablet by mouth once daily 30 Tab 11    PREMPRO 0.45-1.5 mg per tablet take 1 tablet by mouth once daily 28 Tab 10    atorvastatin (LIPITOR) 10 mg tablet Take 1 Tab by mouth daily.  30 Tab 11    cholecalciferol (VITAMIN D3) 1,000 unit tablet Take by mouth daily.        omega-3 fatty acids-vitamin e (FISH OIL) 1,000 mg cap Take 1 Cap by mouth daily.        MULTI-VITAMIN PO Take 1 Tab by mouth every morning.        zolpidem (AMBIEN) 5 mg tablet Take 1 Tab by mouth nightly as needed for up to 30 days. Max Daily Amount: 5 mg. 90 Tab 1            Allergies   Allergen Reactions    Biaxin [Clarithromycin] Unknown (comments)       heartburns    Mobic [Meloxicam] Unknown (comments)       Angio edema                Past Surgical History   Procedure Laterality Date    Hx appendectomy   1978    Hx cervical fusion   2010       with plate    Hx gyn   7617       endometrial tumor (non-cancerous)    Hx pelvic laparoscopy   2000       to remove scar tissue from endometrial tumor    Hx colonoscopy        Hx dilation and curettage                Family History   Problem Relation Age of Onset    Hypertension Father      Heart Disease Father      Cancer Father      Diabetes Father                Social History    Substance Use Topics     Smoking status: Never Smoker     Smokeless tobacco: Never Used     Alcohol use 1.0 oz/week       2 Standard drinks or equivalent per week         Comment: current some day          Review of Systems:  A comprehensive review of systems was negative except for that written in the HPI.       Physical Exam:       Visit Vitals    /76    Pulse 64    Ht 5' 4\" (1.626 m)    Wt 131 lb (59.4 kg)    LMP 02/16/2014    SpO2 99%    BMI 22.49 kg/m2         General: Alert, oriented, cooperative, awake patient in no acute distress   Skin:  Warm, moist with good texture   Eyes:   Sclera are clear, extraocular muscles intact  HENT:  Normocephalic; nares patent; neck is supple; trachea midline  Respiratory: Lungs clear to auscultation bilaterally, breathing is non-labored   Chest:  Symmetric throughout one respiratory excursion; no supraclavicular lymphadenopathy  CV:  Regular rate and rhythm, no appreciable murmurs, rubs, gallops  Abdomen: Soft, protuberant but non-distended; bowel sounds are normoactive, there is palpable mass in right lower quadrant. Extremities: No cyanosis, clubbing or edema  Neurological: No focal signs     CT: 1. Approximately 4 x 8 x 10 cm cystic structure in the right lower quadrant,  just anterior to the cecum, likely corresponding to the palpable mass. This is  benign in appearance and has characteristics suggestive of a peritoneal  inclusion cyst.      2. A 2 cm hypodense lesion in the posterior right hepatic lobe is incompletely  characterized. Recommend further evaluation with liver protocol CT or MRI on a  nonemergent basis.      3. Bilateral hypodense adnexal lesions, the largest measuring 2.9 cm on the  right. Further evaluation with pelvic ultrasound is recommended.     Assessment/Plan:  Heidi Cabral is a 55 y.o. female who has signs and symptoms consistent with a large right lower quadrant cystic mass, probably benign, but she is symptomatic from it. Will remove. This will need laparotomy to avoid rupture.      Ready to proceed.   Alen Greene MD

## 2017-02-28 PROCEDURE — 65270000029 HC RM PRIVATE

## 2017-02-28 PROCEDURE — 74011250636 HC RX REV CODE- 250/636: Performed by: SURGERY

## 2017-02-28 PROCEDURE — 74011250637 HC RX REV CODE- 250/637: Performed by: SURGERY

## 2017-02-28 RX ORDER — HYDROCODONE BITARTRATE AND ACETAMINOPHEN 7.5; 325 MG/1; MG/1
2 TABLET ORAL
Status: DISCONTINUED | OUTPATIENT
Start: 2017-02-28 | End: 2017-03-01 | Stop reason: HOSPADM

## 2017-02-28 RX ORDER — HYDROCODONE BITARTRATE AND ACETAMINOPHEN 7.5; 325 MG/1; MG/1
1 TABLET ORAL
Qty: 40 TAB | Refills: 0 | Status: SHIPPED | OUTPATIENT
Start: 2017-02-28 | End: 2017-09-08

## 2017-02-28 RX ADMIN — ENOXAPARIN SODIUM 40 MG: 40 INJECTION SUBCUTANEOUS at 08:03

## 2017-02-28 RX ADMIN — Medication 10 ML: at 05:02

## 2017-02-28 RX ADMIN — DOCUSATE SODIUM 100 MG: 100 CAPSULE, LIQUID FILLED ORAL at 17:50

## 2017-02-28 RX ADMIN — HYDROCODONE BITARTRATE AND ACETAMINOPHEN 1 TABLET: 7.5; 325 TABLET ORAL at 13:01

## 2017-02-28 RX ADMIN — ATORVASTATIN CALCIUM 10 MG: 10 TABLET, FILM COATED ORAL at 21:36

## 2017-02-28 RX ADMIN — HYDROCODONE BITARTRATE AND ACETAMINOPHEN 2 TABLET: 7.5; 325 TABLET ORAL at 21:36

## 2017-02-28 RX ADMIN — DOCUSATE SODIUM 100 MG: 100 CAPSULE, LIQUID FILLED ORAL at 08:03

## 2017-02-28 RX ADMIN — HYDROMORPHONE HYDROCHLORIDE 1 MG: 1 INJECTION, SOLUTION INTRAMUSCULAR; INTRAVENOUS; SUBCUTANEOUS at 05:02

## 2017-02-28 RX ADMIN — HYDROCODONE BITARTRATE AND ACETAMINOPHEN 1 TABLET: 7.5; 325 TABLET ORAL at 17:50

## 2017-02-28 RX ADMIN — ESCITALOPRAM OXALATE 20 MG: 10 TABLET ORAL at 21:44

## 2017-02-28 RX ADMIN — HYDROCODONE BITARTRATE AND ACETAMINOPHEN 1 TABLET: 7.5; 325 TABLET ORAL at 08:03

## 2017-02-28 RX ADMIN — Medication 10 ML: at 14:00

## 2017-02-28 RX ADMIN — CEFAZOLIN 2 G: 1 INJECTION, POWDER, FOR SOLUTION INTRAMUSCULAR; INTRAVENOUS; PARENTERAL at 05:02

## 2017-02-28 RX ADMIN — Medication 10 ML: at 21:46

## 2017-02-28 RX ADMIN — HYDROMORPHONE HYDROCHLORIDE 1 MG: 1 INJECTION, SOLUTION INTRAMUSCULAR; INTRAVENOUS; SUBCUTANEOUS at 10:18

## 2017-02-28 RX ADMIN — HYDROMORPHONE HYDROCHLORIDE 1 MG: 1 INJECTION, SOLUTION INTRAMUSCULAR; INTRAVENOUS; SUBCUTANEOUS at 01:06

## 2017-02-28 NOTE — OP NOTES
[address] [facility]   OPERATIVE REPORT       Name:  Los Dudley   MR#:  [id number]   :  1960   Account #:  [de-identified]   Date of Adm:  2017       DATE OF SURGERY: 2017    PREOPERATIVE DIAGNOSIS: Right lower quadrant mass. POSTOPERATIVE DIAGNOSIS: Right lower quadrant mass. NAME OF PROCEDURE:    1. Exploratory laparotomy with right lower quadrant mass   excision, 10 cm.   2. Umbilical hernia repair. SURGEON: Niles Jackson MD    INDICATIONS: This is a 23-year-old female who presented with   right lower quadrant pain. CT scan indicated a large cystic   mass. As noticed, she reported similar mass removal about 20, 30   years ago. Given her symptoms and the size of the mass and the   surgery offered to her, especially open surgery is needed to   avoid rupture of this cystic mass. The patient understood the   risks and benefits, agreed to proceed. FINDINGS: This is quite large cystic mass in the right pericolic   gutter. It had a benign appearance. This was able to completely   be removed without any rupture or spillage. PROCEDURE: After informed consent obtained, the patient was   brought into the operating room, left in supine position. General anesthesia was administered. The patient was then   prepped and draped in the usual routine fashion. The lower midline incision was made. We first isolated the   umbilical hernia and umbilicus was detached from the fascia and   then the fascia was opened through the hernia and then extended   up and down to get good exposure. The mass was identified in the   right lower quadrant and the colon was carefully dissected off   the mass. We used extreme precautions to avoid rupture or   spillage of the mass. We were able to remove the mass with   careful dissection. Then the surgical field inspected. No   evidence of other masses, no evidence of bleeding at the end of   the case.      Then the midline fascia was closed with #1 looped PDS in running   fashion. The hernia sac was removed from the umbilicus and the   umbilicus was tacked down to the fascia with 3-0 Vicryl stitch. Skin closed with staples. The patient tolerated the procedure well, transferred to   recovery room in stable condition. All the instrument count, lap   count correct. Estimated blood loss was about 50 mL.         Laxmi Baker MD      BY / [place]   D:  02/27/2017   23:16   T:  02/28/2017   14:09   Job #:  [id number]

## 2017-02-28 NOTE — PROGRESS NOTES
End of shift summary:  Patient has slept for long intervals with respirations even and unlabored on room air. Continent voiding in bathroom, passing gas. Abdominal incision with dressing c/d/i. tx for abdominal pain every 4hrs with dilaudid 1mg iv, reports moderate relief. Iv antibx admin as schedule. Tolerated diet well, denies any nausea. Safety maintained. Call light within reach at all times.

## 2017-02-28 NOTE — PROGRESS NOTES
Leyva catheter leaking and not draining properly. Pt complaining of \"feeling full\". Leyva balloon deflated and catheter advanced with no return urine noted. Leyva pulled at this time and pt assisted to bathroom. Pt voided clear yellow urine.

## 2017-03-01 VITALS
TEMPERATURE: 98.3 F | RESPIRATION RATE: 16 BRPM | BODY MASS INDEX: 21.68 KG/M2 | HEIGHT: 64 IN | HEART RATE: 62 BPM | DIASTOLIC BLOOD PRESSURE: 61 MMHG | OXYGEN SATURATION: 98 % | SYSTOLIC BLOOD PRESSURE: 100 MMHG | WEIGHT: 127 LBS

## 2017-03-01 PROBLEM — R19.03 ABDOMINAL MASS, RLQ (RIGHT LOWER QUADRANT): Status: RESOLVED | Noted: 2017-02-21 | Resolved: 2017-03-01

## 2017-03-01 PROCEDURE — 74011250637 HC RX REV CODE- 250/637: Performed by: SURGERY

## 2017-03-01 RX ADMIN — HYDROCODONE BITARTRATE AND ACETAMINOPHEN 1 TABLET: 7.5; 325 TABLET ORAL at 10:23

## 2017-03-01 RX ADMIN — HYDROCODONE BITARTRATE AND ACETAMINOPHEN 1 TABLET: 7.5; 325 TABLET ORAL at 14:04

## 2017-03-01 RX ADMIN — HYDROCODONE BITARTRATE AND ACETAMINOPHEN 1 TABLET: 7.5; 325 TABLET ORAL at 15:29

## 2017-03-01 RX ADMIN — Medication 10 ML: at 04:53

## 2017-03-01 RX ADMIN — HYDROCODONE BITARTRATE AND ACETAMINOPHEN 2 TABLET: 7.5; 325 TABLET ORAL at 04:51

## 2017-03-01 RX ADMIN — ZOLPIDEM TARTRATE 5 MG: 5 TABLET ORAL at 00:02

## 2017-03-01 RX ADMIN — DOCUSATE SODIUM 100 MG: 100 CAPSULE, LIQUID FILLED ORAL at 08:52

## 2017-03-01 NOTE — PROGRESS NOTES
PLAN:  D/c home today      ASSESSMENT:  Admit Date: 2/27/2017   2 Days Post-Op  Procedure(s):  LAPAROTOMY EXPLORATORY/ ABDOMINAL MASS EXCISION/ UMBILICAL HERNIA REPAIR    Active Problems:    Abdominal mass (2/27/2017)         SUBJECTIVE:  Resting in bed. Feels well. Has been ambulating in halls. Tolerating regular diet and pain has been controlled. Dressings removed and sites inspected by Dr. Glenny Licona. AF, VSS. Wants to go home. OBJECTIVE:  Constitutional: Alert oriented cooperative patient in no acute distress; appears stated age   Visit Vitals    /61    Pulse 62    Temp 98.3 °F (36.8 °C)    Resp 16    Ht 5' 4\" (1.626 m)    Wt 57.6 kg (127 lb)    LMP 02/16/2014    SpO2 98%    BMI 21.8 kg/m2     Eyes: Sclera are clear. EOMs intact  ENMT: No external lesions, gross hearing normal; no obvious neck masses, no ear or lip lesions  CV: RRR, S1S2. Normal perfusion, pulses palpable, no edema  Resp: No JVD. Breathing is non-labored; no audible wheezing. BBS clear, on RA    GI: Soft and non-distended, appropriately tender, incision/staples c/d/i     Musculoskeletal: Unremarkable with normal function. No embolic signs or cyanosis. Neuro: Oriented; moves all 4; no focal deficits  Psychiatric: Normal affect and mood, no memory impairment      Patient Vitals for the past 24 hrs:   BP Temp Pulse Resp SpO2   03/01/17 1055 100/61 98.3 °F (36.8 °C) 62 16 98 %   03/01/17 0705 106/61 97.8 °F (36.6 °C) 61 15 98 %   03/01/17 0400 117/67 97.7 °F (36.5 °C) 77 16 98 %   03/01/17 0001 107/66 98.5 °F (36.9 °C) 62 16 97 %   02/28/17 1930 116/63 98.9 °F (37.2 °C) 65 16 98 %   02/28/17 1540 105/57 98.3 °F (36.8 °C) 66 16 97 %       Labs:  No results for input(s): WBC, HGB, PLT, NA, K, CL, CO2, BUN, CREA, GLU, PTP, INR, APTT, TBIL, TBILI, CBIL, SGOT, GPT, ALT, AP, AML, LPSE, LCAD, NH4, TROPT, TROIQ, HGBEXT, PLTEXT in the last 72 hours.     No lab exists for component: INREXT      ROCK TrujilloC

## 2017-03-01 NOTE — PROGRESS NOTES
Pt's D/C instructions completed. Verbalized understanding of all instructions including diet, activity, s/sx to alert MD, medications, wound care, and f/u appointment. Family at [facility].

## 2017-03-01 NOTE — DISCHARGE SUMMARY
[address] [facility]  [phone]     Discharge Summary     Yuko Joseph  MRN: [id number]     : 1960     Age: 55 y. o. Admit date: 2017     Discharge date:  3/1/17  Attending Physician: Dr. Trevor Calhoun MD, FACS  Primary Discharge Diagnosis:   Active Problems:    Abdominal mass (2017)      Primary Operations or Procedures:  Procedure(s):  LAPAROTOMY EXPLORATORY/ ABDOMINAL MASS EXCISION/ UMBILICAL HERNIA REPAIR     Brief History: Yuko Joseph was admitted with the following history of present illness. Yuko Joseph is a 55 y.o. female who is seen for right lower quadrant mass. She developed right lower quadrant pain over a month with abdominal bloating, urge to go to bathroom. Now she can actually palpate the mass. Interestingly, she has a mass in similar location removed in early . She was told \"endometrial tissue\". She had appendectomy in young age and she had laparoscopic lysis of adhesion in . She is otherwise in good health. She eats drinks well. She denies weight loss. She is non smoker, non drinker. Father had history of multiple myeloma. Hospital Course:  She was taken to the OR on 17 by Dr. Elvia Marte for Exploratory laparotomy with right lower quadrant mass excision, 10 cm and Umbilical hernia repair. She was returned to the floor for routine post-operative management. She has progressed well. Pain has been controlled with PO medications. She is tolerating a regular diet. She is ambulating in the halls. She wants to go home and is felt stable for discharge today. She will f/u with Dr. Elvia Marte in the office. Discharge Medications:   Current Discharge Medication List      START taking these medications    Details   HYDROcodone-acetaminophen (NORCO) 7.5-325 mg per tablet Take 1 Tab by mouth every four (4) hours as needed. Max Daily Amount: 6 Tabs.   Qty: 40 Tab, Refills: 0         CONTINUE these medications which have NOT CHANGED    Details   escitalopram oxalate (LEXAPRO) 20 mg tablet take 1 tablet by mouth once daily  Qty: 30 Tab, Refills: 11      atorvastatin (LIPITOR) 10 mg tablet Take 1 Tab by mouth daily. Qty: 30 Tab, Refills: 11      cholecalciferol (VITAMIN D3) 1,000 unit tablet Take  by mouth daily. Last dose 2/22/17      omega-3 fatty acids-vitamin e (FISH OIL) 1,000 mg cap Take 1 Cap by mouth daily. Last dose 2/22/17      MULTI-VITAMIN PO Take 1 Tab by mouth nightly. Last dose 2/22/17      zolpidem (AMBIEN) 5 mg tablet Take 1 Tab by mouth nightly as needed for up to 30 days. Max Daily Amount: 5 mg. Qty: 90 Tab, Refills: 1    Associated Diagnoses: Insomnia, unspecified type         STOP taking these medications       traMADol (ULTRAM) 50 mg tablet Comments:   Reason for Stopping:                 Discharge Instructions/Follow-up Plans:      MD Instructions: Follow-up with Dr. Elvia Marte  in 1 week. Keep incision/staples clean and dry, may remain uncovered. Do not apply lotions, creams or ointments to incisions/damaris. Uyen Party will be removed at follow-up appointment. Diet - as tolerated - Regular diet. Activity - ambulate - as tolerated - no heavy lifting >10lb. May shower - no tub baths or soaking. No driving while taking narcotics. Do not drink alcohol while taking narcotics. Resume other home medications. If problems or questions arise, please call our office at [phone].     Greater than 30 minutes were spent discharging the patient      Signed:  GISELLE Wilder  3/1/2017  2:36 PM

## 2017-03-01 NOTE — DISCHARGE INSTRUCTIONS
MD Instructions: Follow-up with Dr. Jakub Danielle  in 1 week. Keep incision/staples clean and dry, may remain uncovered. Do not apply lotions, creams or ointments to incisions/damaris. Oli Filter will be removed at follow-up appointment. Diet - as tolerated - Regular diet. Activity - ambulate - as tolerated - no heavy lifting >10lb. May shower - no tub baths or soaking. No driving while taking narcotics. Do not drink alcohol while taking narcotics. Resume other home medications. If problems or questions arise, please call our office at [phone]. DISCHARGE SUMMARY from Nurse    The following personal items are in your possession at time of discharge:    Dental Appliances: None  Visual Aid: Glasses     Home Medications: None  Jewelry: Ring  Clothing: Footwear, Undergarments, Pants, Shirt  Other Valuables: None             PATIENT INSTRUCTIONS:    After general anesthesia or intravenous sedation, for 24 hours or while taking prescription Narcotics:  · Limit your activities  · Do not drive and operate hazardous machinery  · Do not make important personal or business decisions  · Do  not drink alcoholic beverages  · If you have not urinated within 8 hours after discharge, please contact your surgeon on call.     Report the following to your surgeon:  · Excessive pain, swelling, redness or odor of or around the surgical area  · Temperature over 100.5  · Nausea and vomiting lasting longer than 4 hours or if unable to take medications  · Any signs of decreased circulation or nerve impairment to extremity: change in color, persistent  numbness, tingling, coldness or increase pain  · Any questions        What to do at Home:  Recommended activity: Activity as tolerated, per MD instructions    If you experience any of the following symptoms fever > 100.5, nausea, vomiting, abdominal pain, chest pain, shortness of breath please follow up with MD.      *  Please give a list of your current medications to your Primary Care Provider. *  Please update this list whenever your medications are discontinued, doses are      changed, or new medications (including over-the-counter products) are added. *  Please carry medication information at all times in case of emergency situations. These are general instructions for a healthy lifestyle:    No smoking/ No tobacco products/ Avoid exposure to second hand smoke    Surgeon General's Warning:  Quitting smoking now greatly reduces serious risk to your health. Obesity, smoking, and sedentary lifestyle greatly increases your risk for illness    A healthy diet, regular physical exercise & weight monitoring are important for maintaining a healthy lifestyle    You may be retaining fluid if you have a history of heart failure or if you experience any of the following symptoms:  Weight gain of 3 pounds or more overnight or 5 pounds in a week, increased swelling in our hands or feet or shortness of breath while lying flat in bed. Please call your doctor as soon as you notice any of these symptoms; do not wait until your next office visit. Recognize signs and symptoms of STROKE:    F-face looks uneven    A-arms unable to move or move unevenly    S-speech slurred or non-existent    T-time-call 911 as soon as signs and symptoms begin-DO NOT go       Back to bed or wait to see if you get better-TIME IS BRAIN. Warning Signs of HEART ATTACK     Call 911 if you have these symptoms:   Chest discomfort. Most heart attacks involve discomfort in the center of the chest that lasts more than a few minutes, or that goes away and comes back. It can feel like uncomfortable pressure, squeezing, fullness, or pain.  Discomfort in other areas of the upper body. Symptoms can include pain or discomfort in one or both arms, the back, neck, jaw, or stomach.  Shortness of breath with or without chest discomfort.    Other signs may include breaking out in a cold sweat, nausea, or lightheadedness. Don't wait more than five minutes to call 911 - MINUTES MATTER! Fast action can save your life. Calling 911 is almost always the fastest way to get lifesaving treatment. Emergency Medical Services staff can begin treatment when they arrive -- up to an hour sooner than if someone gets to the hospital by car. The discharge information has been reviewed with the patient. The patient verbalized understanding. Discharge medications reviewed with the patient and appropriate educational materials and side effects teaching were provided. Surgery: What to Expect at Home  Your Recovery  This care sheet gives you a general idea about how long it will take for you to recover from your surgery. But each person recovers at a different pace. How can you care for yourself at home? Activity  · Allow your body to heal. Don't move quickly or lift anything heavy until you are feeling better. · Rest when you feel tired. · Your doctor may give you specific instructions on when you can do your normal activities again, such as driving and going back to work. · Be active. Walking is a good choice. Diet  · You can eat your normal diet when you feel well. If your stomach is upset, try bland, low-fat foods like plain rice, broiled chicken, toast, and yogurt. · If your bowel movements are not regular right after surgery, try to avoid constipation and straining. Drink plenty of water. Your doctor may suggest fiber, a stool softener, or a mild laxative. Medicines  · Your doctor will tell you if and when you can restart your medicines. He or she will also give you instructions about taking any new medicines. · If you take blood thinners, such as warfarin (Coumadin), clopidogrel (Plavix), or aspirin, be sure to talk to your doctor. He or she will tell you if and when to start taking those medicines again. Make sure that you understand exactly what your doctor wants you to do.   · Be safe with medicines. Read and follow all instructions on the label. ¨ If the doctor gave you a prescription medicine for pain, take it as prescribed. ¨ If you are not taking a prescription pain medicine, ask your doctor if you can take an over-the-counter medicine. Incision care  · You will have a dressing over the cut (incision). A dressing helps the incision heal and protects it. Your doctor will tell you how to take care of this. · If you have strips of tape on the cut the doctor made, leave the tape on for a week or until it falls off. · If you had stitches, your doctor will tell you when to come back to have them removed. · If you have skin adhesive on the cut, leave it on until it falls off. Skin adhesive is also called liquid stitches. · Change the bandage every day. · Wash the area daily with warm, soapy water, and pat it dry. Don't use hydrogen peroxide or alcohol. They can slow healing. · You may cover the area with a gauze bandage if it oozes fluid or rubs against clothing. · You may shower 24 to 48 hours after surgery. Pat the incision dry. Don't swim or take a bath for the first 2 weeks, or until your doctor tells you it is okay. Follow-up care is a key part of your treatment and safety. Be sure to make and go to all appointments, and call your doctor if you are having problems. It's also a good idea to know your test results and keep a list of the medicines you take. When should you call for help? Call 911 anytime you think you may need emergency care. For example, call if:  · You passed out (lost consciousness). · You have severe trouble breathing. · You have sudden chest pain and shortness of breath, or you cough up blood. Call your doctor now or seek immediate medical care if:  · You have pain that does not get better after you take pain medicine. · You have loose stitches, or your incision comes open. · You are bleeding through your dressing.  A small amount of blood is normal.  · You have signs of infection, such as:  ¨ Increased pain, swelling, warmth, or redness. ¨ Red streaks leading from the incision. ¨ Pus draining from the incision. ¨ A fever. · You have symptoms of a blood clot in your arm or leg (called a deep vein thrombosis). These may include:  ¨ Pain in your calf, back of the knee, thigh, or groin. ¨ Redness and swelling in the arm, leg, or groin. Watch closely for any changes in your health, and be sure to contact your doctor if:  · You do not have a bowel movement after taking a laxative. Where can you learn more? Go to http://yevgeniy-david.info/. Enter H405 in the search box to learn more about \"Surgery: What to Expect at Home. \"  Current as of: August 14, 2016  Content Version: 11.1  © 6581-0089 [employer]. Care instructions adapted under license by [employer] (which disclaims liability or warranty for this information). If you have questions about a medical condition or this instruction, always ask your healthcare professional. [address] any warranty or liability for your use of this information.

## 2017-03-01 NOTE — PROGRESS NOTES
States that norco 7.5-325 mg po is not controlling pain and that Dr. Elvia Marte had told her that she could have 1-2 tabs. Placed call to Dr. Elvia Marte to verify.  Order received for 1-2 tabs PRN

## 2017-09-10 PROBLEM — M54.2 NECK PAIN: Chronic | Status: ACTIVE | Noted: 2017-09-10

## 2017-09-10 PROBLEM — M54.2 NECK PAIN: Status: ACTIVE | Noted: 2017-09-10

## 2017-09-10 PROBLEM — G56.03 CARPAL TUNNEL SYNDROME, BILATERAL: Status: ACTIVE | Noted: 2017-09-10

## 2017-09-10 PROBLEM — Z79.1 NSAID LONG-TERM USE: Status: ACTIVE | Noted: 2017-09-10

## 2018-09-04 PROBLEM — Z79.1 NSAID LONG-TERM USE: Status: RESOLVED | Noted: 2017-09-10 | Resolved: 2018-09-04

## 2019-03-03 PROBLEM — Z79.899 CONTROLLED SUBSTANCE AGREEMENT SIGNED: Status: ACTIVE | Noted: 2019-03-03

## 2020-02-26 PROBLEM — Z28.21 REFUSED INFLUENZA VACCINE: Status: ACTIVE | Noted: 2020-02-26

## 2020-02-26 PROBLEM — R52 PAIN MANAGEMENT: Status: ACTIVE | Noted: 2020-02-26

## 2020-02-27 PROBLEM — Z00.00 WELL ADULT EXAM: Status: ACTIVE | Noted: 2020-02-27

## 2020-02-27 PROBLEM — Z13.1 SCREENING FOR DIABETES MELLITUS: Status: ACTIVE | Noted: 2020-02-27

## 2020-02-27 PROBLEM — Z79.899 HIGH RISK MEDICATION USE: Status: ACTIVE | Noted: 2020-02-27

## 2020-03-28 PROBLEM — Z00.00 WELL ADULT EXAM: Status: RESOLVED | Noted: 2020-02-27 | Resolved: 2020-03-28

## 2020-03-28 PROBLEM — Z13.1 SCREENING FOR DIABETES MELLITUS: Status: RESOLVED | Noted: 2020-02-27 | Resolved: 2020-03-28

## 2020-11-12 RX ORDER — SODIUM CHLORIDE 0.9 % (FLUSH) 0.9 %
5-40 SYRINGE (ML) INJECTION EVERY 8 HOURS
Status: CANCELLED | OUTPATIENT
Start: 2020-11-12

## 2020-11-12 RX ORDER — SODIUM CHLORIDE 0.9 % (FLUSH) 0.9 %
5-40 SYRINGE (ML) INJECTION AS NEEDED
Status: CANCELLED | OUTPATIENT
Start: 2020-11-12

## 2020-12-02 ENCOUNTER — APPOINTMENT (RX ONLY)
Dept: URBAN - METROPOLITAN AREA CLINIC 349 | Facility: CLINIC | Age: 60
Setting detail: DERMATOLOGY
End: 2020-12-02

## 2020-12-02 DIAGNOSIS — Z12.83 ENCOUNTER FOR SCREENING FOR MALIGNANT NEOPLASM OF SKIN: ICD-10-CM

## 2020-12-02 DIAGNOSIS — D18.0 HEMANGIOMA: ICD-10-CM

## 2020-12-02 DIAGNOSIS — L57.0 ACTINIC KERATOSIS: ICD-10-CM

## 2020-12-02 DIAGNOSIS — D22 MELANOCYTIC NEVI: ICD-10-CM

## 2020-12-02 DIAGNOSIS — L82.0 INFLAMED SEBORRHEIC KERATOSIS: ICD-10-CM

## 2020-12-02 PROBLEM — D18.01 HEMANGIOMA OF SKIN AND SUBCUTANEOUS TISSUE: Status: ACTIVE | Noted: 2020-12-02

## 2020-12-02 PROBLEM — D22.5 MELANOCYTIC NEVI OF TRUNK: Status: ACTIVE | Noted: 2020-12-02

## 2020-12-02 PROCEDURE — 11301 SHAVE SKIN LESION 0.6-1.0 CM: CPT

## 2020-12-02 PROCEDURE — ? RETURN TO REFERRING PROVIDER

## 2020-12-02 PROCEDURE — 99243 OFF/OP CNSLTJ NEW/EST LOW 30: CPT | Mod: 25

## 2020-12-02 PROCEDURE — ? PATHOLOGY BILLING

## 2020-12-02 PROCEDURE — 88305 TISSUE EXAM BY PATHOLOGIST: CPT

## 2020-12-02 PROCEDURE — 17003 DESTRUCT PREMALG LES 2-14: CPT | Mod: 59

## 2020-12-02 PROCEDURE — ? BENIGN DESTRUCTION

## 2020-12-02 PROCEDURE — ? LIQUID NITROGEN

## 2020-12-02 PROCEDURE — 17000 DESTRUCT PREMALG LESION: CPT | Mod: 59

## 2020-12-02 PROCEDURE — ? REFERRAL

## 2020-12-02 PROCEDURE — ? COUNSELING

## 2020-12-02 PROCEDURE — 17110 DESTRUCTION B9 LES UP TO 14: CPT | Mod: 59

## 2020-12-02 PROCEDURE — ? SHAVE REMOVAL

## 2020-12-02 PROCEDURE — A4550 SURGICAL TRAYS: HCPCS

## 2020-12-02 ASSESSMENT — LOCATION SIMPLE DESCRIPTION DERM
LOCATION SIMPLE: RIGHT CHEEK
LOCATION SIMPLE: RIGHT AXILLARY VAULT
LOCATION SIMPLE: RIGHT UPPER BACK
LOCATION SIMPLE: LEFT UPPER BACK
LOCATION SIMPLE: ABDOMEN
LOCATION SIMPLE: NOSE
LOCATION SIMPLE: RIGHT CHEEK
LOCATION SIMPLE: ABDOMEN
LOCATION SIMPLE: RIGHT POSTERIOR AXILLA
LOCATION SIMPLE: LEFT UPPER BACK
LOCATION SIMPLE: CHEST
LOCATION SIMPLE: UPPER LIP
LOCATION SIMPLE: CHEST

## 2020-12-02 ASSESSMENT — LOCATION DETAILED DESCRIPTION DERM
LOCATION DETAILED: LEFT RIB CAGE
LOCATION DETAILED: COLUMELLA
LOCATION DETAILED: PHILTRUM
LOCATION DETAILED: RIGHT POSTERIOR AXILLA
LOCATION DETAILED: RIGHT MEDIAL SUPERIOR CHEST
LOCATION DETAILED: RIGHT AXILLARY VAULT
LOCATION DETAILED: RIGHT RIB CAGE
LOCATION DETAILED: RIGHT INFERIOR CENTRAL MALAR CHEEK
LOCATION DETAILED: RIGHT INFERIOR CENTRAL MALAR CHEEK
LOCATION DETAILED: UPPER STERNUM
LOCATION DETAILED: RIGHT INFERIOR UPPER BACK
LOCATION DETAILED: MIDDLE STERNUM
LOCATION DETAILED: RIGHT MEDIAL UPPER BACK
LOCATION DETAILED: LEFT INFERIOR UPPER BACK
LOCATION DETAILED: RIGHT LATERAL SUPERIOR CHEST
LOCATION DETAILED: RIGHT LATERAL MALAR CHEEK
LOCATION DETAILED: MIDDLE STERNUM
LOCATION DETAILED: EPIGASTRIC SKIN
LOCATION DETAILED: EPIGASTRIC SKIN
LOCATION DETAILED: LEFT MEDIAL UPPER BACK
LOCATION DETAILED: RIGHT MEDIAL SUPERIOR CHEST

## 2020-12-02 ASSESSMENT — LOCATION ZONE DERM
LOCATION ZONE: TRUNK
LOCATION ZONE: NOSE
LOCATION ZONE: AXILLAE
LOCATION ZONE: TRUNK
LOCATION ZONE: FACE
LOCATION ZONE: FACE
LOCATION ZONE: LIP

## 2020-12-02 NOTE — PROCEDURE: BENIGN DESTRUCTION
Anesthesia Volume In Cc: 0
Medical Necessity Clause: This procedure was medically necessary because the lesions that were treated were:
Post-Care Instructions: I reviewed with the patient in detail post-care instructions. Patient is to wear sunprotection, and avoid picking at any of the treated lesions. Pt may apply Vaseline to crusted or scabbing areas.
Include Z78.9 (Other Specified Conditions Influencing Health Status) As An Associated Diagnosis?: Yes
Detail Level: Detailed
Add 52 Modifier (Optional): no
Medical Necessity Information: It is in your best interest to select a reason for this procedure from the list below. All of these items fulfill various CMS LCD requirements except the new and changing color options.
Consent: The patient's consent was obtained including but not limited to risks of crusting, scabbing, blistering, scarring, darker or lighter pigmentary change, recurrence, incomplete removal and infection.

## 2020-12-02 NOTE — PROCEDURE: SHAVE REMOVAL
Bill For Surgical Tray: yes
Medical Necessity Information: It is in your best interest to select a reason for this procedure from the list below. All of these items fulfill various CMS LCD requirements except the new and changing color options.
X Size Of Lesion In Cm (Optional): 0
Billing Type: Third-Party Bill
Render Path Notes In Note?: No
Accession #: [email or web address]
Biopsy Method: Dermablade
Notification Instructions: Patient will be notified of biopsy results. However, patient instructed to call the office if not contacted within 2 weeks.
Detail Level: Detailed
Anesthesia Volume In Cc: 0.5
Size Of Lesion In Cm (Required): 0.7
Consent was obtained from the patient. The risks and benefits to therapy were discussed in detail. Specifically, the risks of infection, scarring, bleeding, prolonged wound healing, incomplete removal, allergy to anesthesia, nerve injury and recurrence were addressed. Prior to the procedure, the treatment site was clearly identified and confirmed by the patient. All components of Universal Protocol/PAUSE Rule completed.
Wound Care: Vaseline
Medical Necessity Clause: This procedure was medically necessary because the lesion that was treated was: changing
Anesthesia Type: 2% lidocaine with epinephrine
Hemostasis: Electrocautery
Post-Care Instructions: I reviewed with the patient in detail post-care instructions. Patient is to keep the biopsy site dry overnight, and then apply vaseline twice daily until healed. Patient may apply hydrogen peroxide soaks to remove any crusting. After the procedure, the patient was observed for 5-10 minutes and was oriented to person, place and time and demied feeling dizzy, queasy, and stated that they did not feel that they were going to faint.

## 2020-12-02 NOTE — PROCEDURE: LIQUID NITROGEN
Render Post-Care Instructions In Note?: no
Detail Level: Detailed
Post-Care Instructions: I reviewed with the patient in detail post-care instructions. Patient is to wear sunprotection, and avoid picking at any of the treated lesions. Pt may apply Vaseline to crusted or scabbing areas.
Duration Of Freeze Thaw-Cycle (Seconds): 2
Medical Necessity Information: It is in your best interest to select a reason for this procedure from the list below. All of these items fulfill various CMS LCD requirements except the new and changing color options.
Number Of Freeze-Thaw Cycles: 3 freeze-thaw cycles
Number Of Freeze-Thaw Cycles: 2 freeze-thaw cycles
Consent: The patient's consent was obtained including but not limited to risks of crusting, scabbing, blistering, scarring, darker or lighter pigmentary change, recurrence, incomplete removal and infection.
Include Z78.9 (Other Specified Conditions Influencing Health Status) As An Associated Diagnosis?: Yes
Medical Necessity Clause: This procedure was medically necessary because the lesions that were treated were:
Duration Of Freeze Thaw-Cycle (Seconds): 3

## 2020-12-02 NOTE — PROCEDURE: PATHOLOGY BILLING
Immunohistochemistry (56171 and 10527) billing is not performed here. Please use the Immunohistochemistry Stain Billing plan to accomplish this. Immunohistochemistry ([id number] and 44698) billing is not performed here. Please use the Immunohistochemistry Stain Billing plan to accomplish this.

## 2020-12-09 ENCOUNTER — ANESTHESIA EVENT (OUTPATIENT)
Dept: SURGERY | Age: 60
End: 2020-12-09
Payer: COMMERCIAL

## 2020-12-10 ENCOUNTER — ANESTHESIA (OUTPATIENT)
Dept: SURGERY | Age: 60
End: 2020-12-10
Payer: COMMERCIAL

## 2020-12-10 ENCOUNTER — HOSPITAL ENCOUNTER (OUTPATIENT)
Age: 60
Setting detail: OUTPATIENT SURGERY
Discharge: HOME | End: 2020-12-10
Attending: ORTHOPAEDIC SURGERY | Admitting: ORTHOPAEDIC SURGERY
Payer: COMMERCIAL

## 2020-12-10 VITALS
BODY MASS INDEX: 20.55 KG/M2 | HEART RATE: 70 BPM | RESPIRATION RATE: 16 BRPM | SYSTOLIC BLOOD PRESSURE: 146 MMHG | HEIGHT: 64 IN | OXYGEN SATURATION: 97 % | DIASTOLIC BLOOD PRESSURE: 70 MMHG | TEMPERATURE: 97.5 F | WEIGHT: 120.4 LBS

## 2020-12-10 PROCEDURE — 74011250636 HC RX REV CODE- 250/636: Performed by: NURSE ANESTHETIST, CERTIFIED REGISTERED

## 2020-12-10 PROCEDURE — 76010000138 HC OR TIME 0.5 TO 1 HR: Performed by: ORTHOPAEDIC SURGERY

## 2020-12-10 PROCEDURE — 74011000250 HC RX REV CODE- 250: Performed by: NURSE ANESTHETIST, CERTIFIED REGISTERED

## 2020-12-10 PROCEDURE — 74011000250 HC RX REV CODE- 250: Performed by: ORTHOPAEDIC SURGERY

## 2020-12-10 PROCEDURE — 88304 TISSUE EXAM BY PATHOLOGIST: CPT

## 2020-12-10 PROCEDURE — 76210000063 HC OR PH I REC FIRST 0.5 HR: Performed by: ORTHOPAEDIC SURGERY

## 2020-12-10 PROCEDURE — 77030002888 HC SUT CHRMC J&J -A: Performed by: ORTHOPAEDIC SURGERY

## 2020-12-10 PROCEDURE — 2709999900 HC NON-CHARGEABLE SUPPLY: Performed by: ORTHOPAEDIC SURGERY

## 2020-12-10 PROCEDURE — 77030000032 HC CUF TRNQT ZIMM -B: Performed by: ORTHOPAEDIC SURGERY

## 2020-12-10 PROCEDURE — 76210000020 HC REC RM PH II FIRST 0.5 HR: Performed by: ORTHOPAEDIC SURGERY

## 2020-12-10 PROCEDURE — 77030010507 HC ADH SKN DERMBND J&J -B: Performed by: ORTHOPAEDIC SURGERY

## 2020-12-10 PROCEDURE — 77030002916 HC SUT ETHLN J&J -A: Performed by: ORTHOPAEDIC SURGERY

## 2020-12-10 PROCEDURE — 74011250636 HC RX REV CODE- 250/636: Performed by: ORTHOPAEDIC SURGERY

## 2020-12-10 PROCEDURE — 74011250636 HC RX REV CODE- 250/636

## 2020-12-10 PROCEDURE — 76060000032 HC ANESTHESIA 0.5 TO 1 HR: Performed by: ORTHOPAEDIC SURGERY

## 2020-12-10 PROCEDURE — 74011000250 HC RX REV CODE- 250: Performed by: ANESTHESIOLOGY

## 2020-12-10 PROCEDURE — 74011250636 HC RX REV CODE- 250/636: Performed by: ANESTHESIOLOGY

## 2020-12-10 RX ORDER — SODIUM CHLORIDE 0.9 % (FLUSH) 0.9 %
5-40 SYRINGE (ML) INJECTION EVERY 8 HOURS
Status: DISCONTINUED | OUTPATIENT
Start: 2020-12-10 | End: 2020-12-10 | Stop reason: HOSPADM

## 2020-12-10 RX ORDER — LIDOCAINE HYDROCHLORIDE 5 MG/ML
INJECTION, SOLUTION INFILTRATION; INTRAVENOUS AS NEEDED
Status: DISCONTINUED | OUTPATIENT
Start: 2020-12-10 | End: 2020-12-10 | Stop reason: HOSPADM

## 2020-12-10 RX ORDER — NALOXONE HYDROCHLORIDE 0.4 MG/ML
0.2 INJECTION, SOLUTION INTRAMUSCULAR; INTRAVENOUS; SUBCUTANEOUS AS NEEDED
Status: DISCONTINUED | OUTPATIENT
Start: 2020-12-10 | End: 2020-12-10 | Stop reason: HOSPADM

## 2020-12-10 RX ORDER — HYDROMORPHONE HYDROCHLORIDE 2 MG/ML
0.5 INJECTION, SOLUTION INTRAMUSCULAR; INTRAVENOUS; SUBCUTANEOUS
Status: DISCONTINUED | OUTPATIENT
Start: 2020-12-10 | End: 2020-12-10 | Stop reason: HOSPADM

## 2020-12-10 RX ORDER — BUPIVACAINE HYDROCHLORIDE 2.5 MG/ML
INJECTION, SOLUTION EPIDURAL; INFILTRATION; INTRACAUDAL AS NEEDED
Status: DISCONTINUED | OUTPATIENT
Start: 2020-12-10 | End: 2020-12-10 | Stop reason: HOSPADM

## 2020-12-10 RX ORDER — MIDAZOLAM HYDROCHLORIDE 1 MG/ML
2 INJECTION, SOLUTION INTRAMUSCULAR; INTRAVENOUS ONCE
Status: DISCONTINUED | OUTPATIENT
Start: 2020-12-10 | End: 2020-12-10 | Stop reason: HOSPADM

## 2020-12-10 RX ORDER — MIDAZOLAM HYDROCHLORIDE 1 MG/ML
2 INJECTION, SOLUTION INTRAMUSCULAR; INTRAVENOUS
Status: DISCONTINUED | OUTPATIENT
Start: 2020-12-10 | End: 2020-12-10 | Stop reason: HOSPADM

## 2020-12-10 RX ORDER — ACETAMINOPHEN 500 MG
1000 TABLET ORAL ONCE
Status: DISCONTINUED | OUTPATIENT
Start: 2020-12-10 | End: 2020-12-10 | Stop reason: HOSPADM

## 2020-12-10 RX ORDER — SODIUM CHLORIDE 0.9 % (FLUSH) 0.9 %
5-40 SYRINGE (ML) INJECTION AS NEEDED
Status: DISCONTINUED | OUTPATIENT
Start: 2020-12-10 | End: 2020-12-10 | Stop reason: HOSPADM

## 2020-12-10 RX ORDER — OXYCODONE HYDROCHLORIDE 5 MG/1
5 TABLET ORAL
Status: DISCONTINUED | OUTPATIENT
Start: 2020-12-10 | End: 2020-12-10 | Stop reason: HOSPADM

## 2020-12-10 RX ORDER — DIPHENHYDRAMINE HYDROCHLORIDE 50 MG/ML
12.5 INJECTION, SOLUTION INTRAMUSCULAR; INTRAVENOUS
Status: DISCONTINUED | OUTPATIENT
Start: 2020-12-10 | End: 2020-12-10 | Stop reason: HOSPADM

## 2020-12-10 RX ORDER — PROPOFOL 10 MG/ML
INJECTION, EMULSION INTRAVENOUS AS NEEDED
Status: DISCONTINUED | OUTPATIENT
Start: 2020-12-10 | End: 2020-12-10 | Stop reason: HOSPADM

## 2020-12-10 RX ORDER — ACETAMINOPHEN 325 MG/1
325 TABLET ORAL
COMMUNITY

## 2020-12-10 RX ORDER — METHYLPREDNISOLONE ACETATE 80 MG/ML
INJECTION, SUSPENSION INTRA-ARTICULAR; INTRALESIONAL; INTRAMUSCULAR; SOFT TISSUE AS NEEDED
Status: DISCONTINUED | OUTPATIENT
Start: 2020-12-10 | End: 2020-12-10 | Stop reason: HOSPADM

## 2020-12-10 RX ORDER — SODIUM CHLORIDE, SODIUM LACTATE, POTASSIUM CHLORIDE, CALCIUM CHLORIDE 600; 310; 30; 20 MG/100ML; MG/100ML; MG/100ML; MG/100ML
100 INJECTION, SOLUTION INTRAVENOUS CONTINUOUS
Status: DISCONTINUED | OUTPATIENT
Start: 2020-12-10 | End: 2020-12-10 | Stop reason: HOSPADM

## 2020-12-10 RX ORDER — LIDOCAINE HYDROCHLORIDE 10 MG/ML
0.1 INJECTION INFILTRATION; PERINEURAL AS NEEDED
Status: DISCONTINUED | OUTPATIENT
Start: 2020-12-10 | End: 2020-12-10 | Stop reason: HOSPADM

## 2020-12-10 RX ORDER — CEFAZOLIN SODIUM/WATER 2 G/20 ML
2 SYRINGE (ML) INTRAVENOUS ONCE
Status: COMPLETED | OUTPATIENT
Start: 2020-12-10 | End: 2020-12-10

## 2020-12-10 RX ORDER — FENTANYL CITRATE 50 UG/ML
100 INJECTION, SOLUTION INTRAMUSCULAR; INTRAVENOUS ONCE
Status: DISCONTINUED | OUTPATIENT
Start: 2020-12-10 | End: 2020-12-10 | Stop reason: HOSPADM

## 2020-12-10 RX ORDER — FLUMAZENIL 0.1 MG/ML
0.2 INJECTION INTRAVENOUS
Status: DISCONTINUED | OUTPATIENT
Start: 2020-12-10 | End: 2020-12-10 | Stop reason: HOSPADM

## 2020-12-10 RX ORDER — PROPOFOL 10 MG/ML
INJECTION, EMULSION INTRAVENOUS
Status: DISCONTINUED | OUTPATIENT
Start: 2020-12-10 | End: 2020-12-10 | Stop reason: HOSPADM

## 2020-12-10 RX ORDER — OXYCODONE HYDROCHLORIDE 5 MG/1
10 TABLET ORAL
Status: DISCONTINUED | OUTPATIENT
Start: 2020-12-10 | End: 2020-12-10 | Stop reason: HOSPADM

## 2020-12-10 RX ORDER — MIDAZOLAM HYDROCHLORIDE 1 MG/ML
INJECTION, SOLUTION INTRAMUSCULAR; INTRAVENOUS AS NEEDED
Status: DISCONTINUED | OUTPATIENT
Start: 2020-12-10 | End: 2020-12-10 | Stop reason: HOSPADM

## 2020-12-10 RX ADMIN — Medication 2 G: at 09:15

## 2020-12-10 RX ADMIN — LIDOCAINE HYDROCHLORIDE 35 ML: 5 INJECTION, SOLUTION INFILTRATION at 09:24

## 2020-12-10 RX ADMIN — PROPOFOL 140 MCG/KG/MIN: 10 INJECTION, EMULSION INTRAVENOUS at 09:24

## 2020-12-10 RX ADMIN — MIDAZOLAM 2 MG: 1 INJECTION INTRAMUSCULAR; INTRAVENOUS at 09:12

## 2020-12-10 RX ADMIN — SODIUM CHLORIDE, SODIUM LACTATE, POTASSIUM CHLORIDE, AND CALCIUM CHLORIDE 100 ML/HR: 600; 310; 30; 20 INJECTION, SOLUTION INTRAVENOUS at 09:00

## 2020-12-10 RX ADMIN — PROPOFOL 40 MG: 10 INJECTION, EMULSION INTRAVENOUS at 09:24

## 2020-12-10 NOTE — ANESTHESIA PREPROCEDURE EVALUATION
Anesthetic History   No history of anesthetic complications            Review of Systems / Medical History  Patient summary reviewed and pertinent labs reviewed    Pulmonary  Within defined limits                 Neuro/Psych   Within defined limits           Cardiovascular              Hyperlipidemia    Exercise tolerance: >4 METS     GI/Hepatic/Renal  Within defined limits              Endo/Other  Within defined limits           Other Findings   Comments: H/O - Abdominal mass  Insomnia  Anxiety  Cervical fusion--2 level           Physical Exam    Airway  Mallampati: I  TM Distance: 4 - 6 cm  Neck ROM: normal range of motion   Mouth opening: Normal     Cardiovascular  Regular rate and rhythm,  S1 and S2 normal,  no murmur, click, rub, or gallop  Rhythm: regular  Rate: normal         Dental    Dentition: Bridges  Comments: Upper bridge   Pulmonary  Breath sounds clear to auscultation               Abdominal  GI exam deferred       Other Findings            Anesthetic Plan    ASA: 2  Anesthesia type: regional - [place] block          Induction: Intravenous  Anesthetic plan and risks discussed with: Patient

## 2020-12-10 NOTE — OP NOTES
Hand Surgery Operative Note      Jose Carrillo   62 y.o.   female      Pre-op diagnosis: Left thumb Trigger Finger, left wrist ganglion, right trigger thumb  Post op diagnosis: same      Procedure: Left  thumb Trigger Finger Release, left volar wrist ganglion excision, right thumb A1 pulley steroid injection (CPT 16154, 87128, 47018)     Surgeon: Noemi Rosales MD, PhD      Anesthesia: Eusebio Block     Tourniquet time:   Total Tourniquet Time Documented:  Forearm (Left) - 24 minutes  Total: Forearm (Left) - 24 minutes        Procedure indications: Patient with catching and locking of the above mentioned finger(s) which have been recalcitrant to nonoperative measures. After Thorough discussion, the patient decided to proceed with surgical management. We discussed in detail surgical risks including scar, pain, bleeding, infection, anesthetic risks, neurovascular injury, need for further surgery,  weakness, stiffness, risk of death and potential risk of other unforseen complication. Procedure description:      Patient was placed in the supine position and after appropriate time-out and side, site and procedure confirmed. The anesthesia team provided a Eusebio Block. The incision was made in line with the Left thumb MP vcrease under loupe magnification. Blunt dissection was used to identify the A1 pulley as well as the neurovascular bundle on each side of the flexor tendon. Blunt retraction was used to protect the neurovascular bundles. Sharp incision was made on top of the A1 pulley and scissor dissection was used to extend the sheath incision proximally to release the palmar pulley and distally until the oblique pulley was encountered. The flexor tendons were then mobilized and not catching or clicking was identified. Wound was irrigated and hemostasis was obtained with bipolar cautery. Skin edges were infiltrated with . 25% Bupivacaine.   Wound then closed with 4-0 rapide and sterile dressing applied. Attention was brought to the volar side of the wrist, a curvilinear incision was made over the FCR where the mass was located, blunt dissection was carried down with care to protect the radial artery, the ganglion cyst was excised en bloc along with its stalk, the volar sheath of the FCR was opened to prevent regrowth of the mass. Tourniquet was deflated, hemostasis was obtained and the wound was closed with 4-0 Vicryl Rapide and glue. .    The right thumb A1 pulley was injected with 1 cc of Depo-Medrol and 1 cc of bupivacaine after prepping the skin with alcohol. Disposition: To PACU with no complications and follow up per routine. Patient is instructed to remove dressings in five days and other precautions include avoidance of heavy and repetitive lifting for 2 weeks, when an appointment for follow up and suture removal will take place.      Carie Daly MD  12/10/20  2:14 PM

## 2020-12-10 NOTE — H&P
H&P Update: Enoch Evans was seen and examined. History and physical has been reviewed. The patient has been examined.  There have been no significant clinical changes since the completion of the originally dated History and Iesha Amado MD, PhD  Orthopaedic Surgery  12/10/20  8:38 AM

## 2020-12-10 NOTE — ANESTHESIA POSTPROCEDURE EVALUATION
Procedure(s):  LEFT THUMB TRIGGER RELEASE / CYST EXCISION FROM THE LEFT WRIST/STEROID INJECTION RIGHT HAND. regional    Anesthesia Post Evaluation      Multimodal analgesia: multimodal analgesia used between 6 hours prior to anesthesia start to PACU discharge  Patient location during evaluation: PACU  Patient participation: complete - patient participated  Level of consciousness: awake and alert  Pain management: adequate  Airway patency: patent  Anesthetic complications: no  Cardiovascular status: acceptable and hemodynamically stable  Respiratory status: acceptable  Hydration status: acceptable  Post anesthesia nausea and vomiting:  none  Final Post Anesthesia Temperature Assessment:  Normothermia (36.0-37.5 degrees C)      INITIAL Post-op Vital signs:   Vitals Value Taken Time   /62 12/10/2020 10:22 AM   Temp 36.6 °C (97.8 °F) 12/10/2020 10:04 AM   Pulse 63 12/10/2020 10:24 AM   Resp 14 12/10/2020 10:04 AM   SpO2 97 % 12/10/2020 10:24 AM   Vitals shown include unvalidated device data.

## 2020-12-10 NOTE — DISCHARGE INSTRUCTIONS
Postoperative  Instructions:      Weightbearing or Lifting:  Limit  weight  lifting  to  less  than  2  pounds  (coffee  mug)  for  the  first  2  weeks  after  surgery. Dressing  instructions:    Keep  your  dressing  and/or  splint  clean  and  dry  at  all  times. You  can  remove  your  dressing  on  post-operative  day  #5  and  change  with  a  dry/sterile  dressing  or  Band-Aids  as  needed  thereafter. Showering  Instructions:  May  shower  But keep surgical dressing clean and dry until removed as explained above. After dressing is removed, you may allow soapy water to run through the incision during showers but do not scrub. After each shower, pat dry and apply a dry dressing. Do  not  soak  your  Incision in still water or bathtub  for  3  weeks  after  surgery. If  the  incision  gets  wet otherwise,  pat  dry  and  do  not  scrub  the  incision. Do  not  apply  cream  or  lotion  to  incision      Pain  Control:  - You  have  been  given  a  prescription  to  be  taken  as  directed  for  post-operative  pain  control. In  addition,  elevate  the  operative  extremity  above  the  heart  at  all  times  to  prevent  swelling  and  throbbing  pain. - If you develop constipation while taking narcotic pain medications (Norco, Hydrocodone, Percocet, Oxycodone, Dilaudid, Hydromorphone) take  over-the-counter  Colace,  100mg  by  mouth  twice  a  Day. - Nausea  is  a  common  side  effect  of  many  pain  medications. You  will  want  to  eat something  before  taking  your  pain  medicine  to  help  prevent  Nausea. - If  you  are  taking  a  prescription  pain  medication  that  contains  acetaminophen,  we  recommend  that  you  do  not  take  additional  over  the  counter  acetaminophen  (Tylenol®).       Other  pain  relieving  options:   - Using  a  cold  pack  to  ice  the  affected  area  a  few  times  a  day  (15  to  20  minutes  at  a  time)  can  help  to  relieve pain,  reduce  swelling  and  bruising.      - Elevation  of  the  affected  area  can  also  help  to  reduce  pain  and  swelling. Did  you  receive  a  nerve  Block? A  nerve  block  can  provide  pain  relief  for  one  hour  to  two  days  after  your  surgery. As  long  as  the  nerve  block  is  working,  you  will  experience  little  or  no  sensation  in  the  area  the  surgeon  operated  on. As  the  nerve  block  wears  off,  you  will  begin  to  experience  pain  or  discomfort. It  is  very  important  that  you  begin  taking  your  prescribed  pain  medication  before  the  nerve  block  fully  wears  off. The first sign that the nerve block is wearing off is tingling in your fingers. Treating  your  pain  at  the  first  sign  of  the  block  wearing  off  will  ensure  your  pain  is  better  controlled  and  more  tolerable  when  full-sensation  returns. Do  not  wait  until  the  pain  is  intolerable,  as  the  medicine  will  be  less  effective. It  is  better  to  treat  pain  in  advance  than  to  try  and  catch  up. General  Anesthesia or Sedation:      If  you  did  not  receive  a  nerve  block  during  your  surgery,  you  will  need  to  start  taking  your  pain  medication  shortly  after  your  surgery  and  should  continue  to  do  so  as  prescribed  by  your  surgeon. Please  call  [phone]  with any concern and ask to speak with Philip Stephens. Concerning problems include:      -  Excessive  redness  of  the  incisions      -  Drainage  for  more  than  2  Days after surgery or any foul smelling drainage  -  Fever  of  more  than  101.5  F      Please  call  [phone]  if  you  do  not  receive  or  are  unsure  of  your  first  follow-up  appointment. You  should  see  the  doctor  10-14  days  after  your  Surgery. Thank you for choosing me and [address] [place] [address] for your care.  I will go above and beyond to ensure you receive the best care possible. Nathen Burnette MD, PhD    After general anesthesia or intravenous sedation, for 24 hours or while taking prescription Narcotics:  · Limit your activities  · A responsible adult needs to be with you for the next 24 hours  · Do not drive and operate hazardous machinery  · Do not make important personal or business decisions  · Do not drink alcoholic beverages  · If you have not urinated within 8 hours after discharge, and you are experiencing discomfort from urinary retention, please go to the nearest ED. · If you have sleep apnea and have a CPAP machine, please use it for all naps and sleeping. · Please use caution when taking narcotics and any of your home medications that may cause drowsiness. *  Please give a list of your current medications to your Primary Care Provider. *  Please update this list whenever your medications are discontinued, doses are      changed, or new medications (including over-the-counter products) are added. *  Please carry medication information at all times in case of emergency situations. These are general instructions for a healthy lifestyle:  No smoking/ No tobacco products/ Avoid exposure to second hand smoke  Surgeon General's Warning:  Quitting smoking now greatly reduces serious risk to your health. Obesity, smoking, and sedentary lifestyle greatly increases your risk for illness  A healthy diet, regular physical exercise & weight monitoring are important for maintaining a healthy lifestyle    You may be retaining fluid if you have a history of heart failure or if you experience any of the following symptoms:  Weight gain of 3 pounds or more overnight or 5 pounds in a week, increased swelling in our hands or feet or shortness of breath while lying flat in bed. Please call your doctor as soon as you notice any of these symptoms; do not wait until your next office visit.

## 2021-05-01 PROBLEM — Z00.00 WELL ADULT EXAM: Chronic | Status: ACTIVE | Noted: 2021-05-01

## 2021-05-01 PROBLEM — G89.29 CHRONIC NECK PAIN WITH NORMAL NEUROLOGICAL EXAMINATION: Status: ACTIVE | Noted: 2017-09-10

## 2021-05-01 PROBLEM — Z02.83 ENCOUNTER FOR DRUG SCREENING: Status: ACTIVE | Noted: 2021-05-01

## 2021-05-01 PROBLEM — M25.50 ARTHRALGIA OF MULTIPLE SITES, BILATERAL: Status: ACTIVE | Noted: 2021-05-01

## 2021-05-01 PROBLEM — Z02.83 ENCOUNTER FOR DRUG SCREENING: Chronic | Status: ACTIVE | Noted: 2021-05-01

## 2021-05-01 PROBLEM — Z13.1 SCREENING FOR DIABETES MELLITUS: Chronic | Status: ACTIVE | Noted: 2021-05-01

## 2021-05-01 PROBLEM — Z13.1 SCREENING FOR DIABETES MELLITUS: Status: ACTIVE | Noted: 2021-05-01

## 2021-05-01 PROBLEM — Z00.00 WELL ADULT EXAM: Status: ACTIVE | Noted: 2021-05-01

## 2021-09-28 PROBLEM — M18.12 PRIMARY OSTEOARTHRITIS OF FIRST CARPOMETACARPAL JOINT OF LEFT HAND: Status: ACTIVE | Noted: 2021-09-28

## 2021-10-22 PROBLEM — M72.2 PLANTAR FASCIITIS, BILATERAL: Status: ACTIVE | Noted: 2021-10-22

## 2021-10-22 PROBLEM — Z79.899 HIGH RISK MEDICATION USE: Chronic | Status: ACTIVE | Noted: 2020-02-27

## 2021-10-22 PROBLEM — Z92.29 COVID-19 VACCINE SERIES COMPLETED: Status: ACTIVE | Noted: 2021-10-22

## 2021-12-02 ENCOUNTER — APPOINTMENT (RX ONLY)
Dept: URBAN - METROPOLITAN AREA CLINIC 349 | Facility: CLINIC | Age: 61
Setting detail: DERMATOLOGY
End: 2021-12-02

## 2021-12-02 DIAGNOSIS — L82.1 OTHER SEBORRHEIC KERATOSIS: ICD-10-CM

## 2021-12-02 DIAGNOSIS — D18.0 HEMANGIOMA: ICD-10-CM

## 2021-12-02 DIAGNOSIS — L57.0 ACTINIC KERATOSIS: ICD-10-CM

## 2021-12-02 DIAGNOSIS — L82.0 INFLAMED SEBORRHEIC KERATOSIS: ICD-10-CM

## 2021-12-02 DIAGNOSIS — L81.4 OTHER MELANIN HYPERPIGMENTATION: ICD-10-CM

## 2021-12-02 DIAGNOSIS — D22 MELANOCYTIC NEVI: ICD-10-CM

## 2021-12-02 DIAGNOSIS — Z12.83 ENCOUNTER FOR SCREENING FOR MALIGNANT NEOPLASM OF SKIN: ICD-10-CM

## 2021-12-02 PROBLEM — D18.01 HEMANGIOMA OF SKIN AND SUBCUTANEOUS TISSUE: Status: ACTIVE | Noted: 2021-12-02

## 2021-12-02 PROBLEM — D22.5 MELANOCYTIC NEVI OF TRUNK: Status: ACTIVE | Noted: 2021-12-02

## 2021-12-02 PROCEDURE — 17003 DESTRUCT PREMALG LES 2-14: CPT | Mod: 59

## 2021-12-02 PROCEDURE — ? COUNSELING

## 2021-12-02 PROCEDURE — 17110 DESTRUCTION B9 LES UP TO 14: CPT

## 2021-12-02 PROCEDURE — ? LIQUID NITROGEN

## 2021-12-02 PROCEDURE — 99213 OFFICE O/P EST LOW 20 MIN: CPT | Mod: 25

## 2021-12-02 PROCEDURE — 17000 DESTRUCT PREMALG LESION: CPT | Mod: 59

## 2021-12-02 ASSESSMENT — LOCATION SIMPLE DESCRIPTION DERM
LOCATION SIMPLE: CHEST
LOCATION SIMPLE: LEFT CHEEK
LOCATION SIMPLE: ABDOMEN
LOCATION SIMPLE: RIGHT UPPER BACK
LOCATION SIMPLE: ABDOMEN
LOCATION SIMPLE: LEFT UPPER BACK
LOCATION SIMPLE: CHEST
LOCATION SIMPLE: LEFT UPPER BACK
LOCATION SIMPLE: RIGHT BREAST
LOCATION SIMPLE: RIGHT CHEEK

## 2021-12-02 ASSESSMENT — LOCATION DETAILED DESCRIPTION DERM
LOCATION DETAILED: EPIGASTRIC SKIN
LOCATION DETAILED: RIGHT MEDIAL BREAST 5-6:00 REGION
LOCATION DETAILED: LEFT MID-UPPER BACK
LOCATION DETAILED: LEFT MEDIAL UPPER BACK
LOCATION DETAILED: UPPER STERNUM
LOCATION DETAILED: EPIGASTRIC SKIN
LOCATION DETAILED: RIGHT MEDIAL UPPER BACK
LOCATION DETAILED: LEFT SUPERIOR NASAL CHEEK
LOCATION DETAILED: RIGHT INFERIOR UPPER BACK
LOCATION DETAILED: RIGHT INFERIOR LATERAL MALAR CHEEK
LOCATION DETAILED: LEFT SUPERIOR MEDIAL UPPER BACK
LOCATION DETAILED: LEFT RIB CAGE
LOCATION DETAILED: LEFT INFERIOR UPPER BACK
LOCATION DETAILED: RIGHT INFERIOR CENTRAL MALAR CHEEK
LOCATION DETAILED: LEFT SUPERIOR UPPER BACK
LOCATION DETAILED: RIGHT MEDIAL MALAR CHEEK
LOCATION DETAILED: RIGHT LATERAL SUPERIOR CHEST

## 2021-12-02 ASSESSMENT — LOCATION ZONE DERM
LOCATION ZONE: TRUNK
LOCATION ZONE: FACE
LOCATION ZONE: TRUNK

## 2021-12-02 NOTE — PROCEDURE: LIQUID NITROGEN
Show Aperture Variable?: Yes
Number Of Freeze-Thaw Cycles: 2 freeze-thaw cycles
Render Note In Bullet Format When Appropriate: No
Detail Level: Detailed
Medical Necessity Clause: This procedure was medically necessary because the lesions that were treated were:
Duration Of Freeze Thaw-Cycle (Seconds): 3
Duration Of Freeze Thaw-Cycle (Seconds): 2
Consent: The patient's consent was obtained including but not limited to risks of crusting, scabbing, blistering, scarring, darker or lighter pigmentary change, recurrence, incomplete removal and infection.
Medical Necessity Information: It is in your best interest to select a reason for this procedure from the list below. All of these items fulfill various CMS LCD requirements except the new and changing color options.
Post-Care Instructions: I reviewed with the patient in detail post-care instructions. Patient is to wear sunprotection, and avoid picking at any of the treated lesions. Pt may apply Vaseline to crusted or scabbing areas.
Number Of Freeze-Thaw Cycles: 3 freeze-thaw cycles

## 2022-03-18 PROBLEM — Z28.21 REFUSED INFLUENZA VACCINE: Status: ACTIVE | Noted: 2020-02-26

## 2022-03-18 PROBLEM — M25.50 ARTHRALGIA OF MULTIPLE SITES, BILATERAL: Status: ACTIVE | Noted: 2021-05-01

## 2022-03-19 PROBLEM — Z92.29 COVID-19 VACCINE SERIES COMPLETED: Status: ACTIVE | Noted: 2021-10-22

## 2022-03-19 PROBLEM — M18.12 PRIMARY OSTEOARTHRITIS OF FIRST CARPOMETACARPAL JOINT OF LEFT HAND: Status: ACTIVE | Noted: 2021-09-28

## 2022-03-19 PROBLEM — Z79.899 HIGH RISK MEDICATION USE: Status: ACTIVE | Noted: 2020-02-27

## 2022-03-19 PROBLEM — Z79.899 CONTROLLED SUBSTANCE AGREEMENT SIGNED: Status: ACTIVE | Noted: 2019-03-03

## 2022-03-19 PROBLEM — Z00.00 WELL ADULT EXAM: Status: ACTIVE | Noted: 2021-05-01

## 2022-03-19 PROBLEM — M54.2 CHRONIC NECK PAIN WITH NORMAL NEUROLOGICAL EXAMINATION: Status: ACTIVE | Noted: 2017-09-10

## 2022-03-19 PROBLEM — G89.29 CHRONIC NECK PAIN WITH NORMAL NEUROLOGICAL EXAMINATION: Status: ACTIVE | Noted: 2017-09-10

## 2022-03-19 PROBLEM — R52 PAIN MANAGEMENT: Status: ACTIVE | Noted: 2020-02-26

## 2022-03-19 PROBLEM — M72.2 PLANTAR FASCIITIS, BILATERAL: Status: ACTIVE | Noted: 2021-10-22

## 2022-03-20 PROBLEM — Z13.1 SCREENING FOR DIABETES MELLITUS: Status: ACTIVE | Noted: 2021-05-01

## 2022-03-20 PROBLEM — G56.03 CARPAL TUNNEL SYNDROME, BILATERAL: Status: ACTIVE | Noted: 2017-09-10

## 2022-03-21 NOTE — PROGRESS NOTES
Patient called to remind of procedure tomorrow. Arrival time, location, and  policy confirmed. Opportunity for questions provided.

## 2022-03-22 ENCOUNTER — ANESTHESIA EVENT (OUTPATIENT)
Dept: ENDOSCOPY | Age: 62
End: 2022-03-22
Payer: COMMERCIAL

## 2022-03-22 ENCOUNTER — ANESTHESIA (OUTPATIENT)
Dept: ENDOSCOPY | Age: 62
End: 2022-03-22
Payer: COMMERCIAL

## 2022-03-22 ENCOUNTER — HOSPITAL ENCOUNTER (OUTPATIENT)
Age: 62
Setting detail: OUTPATIENT SURGERY
Discharge: HOME | End: 2022-03-22
Attending: SURGERY | Admitting: SURGERY
Payer: COMMERCIAL

## 2022-03-22 VITALS
HEIGHT: 65 IN | SYSTOLIC BLOOD PRESSURE: 101 MMHG | WEIGHT: 125 LBS | HEART RATE: 68 BPM | OXYGEN SATURATION: 97 % | TEMPERATURE: 96.8 F | BODY MASS INDEX: 20.83 KG/M2 | DIASTOLIC BLOOD PRESSURE: 54 MMHG | RESPIRATION RATE: 16 BRPM

## 2022-03-22 DIAGNOSIS — Z12.11 ENCOUNTER FOR SCREENING COLONOSCOPY: ICD-10-CM

## 2022-03-22 PROCEDURE — 74011250636 HC RX REV CODE- 250/636: Performed by: NURSE ANESTHETIST, CERTIFIED REGISTERED

## 2022-03-22 PROCEDURE — 76040000025: Performed by: SURGERY

## 2022-03-22 PROCEDURE — 76060000032 HC ANESTHESIA 0.5 TO 1 HR: Performed by: SURGERY

## 2022-03-22 PROCEDURE — 45378 DIAGNOSTIC COLONOSCOPY: CPT | Performed by: SURGERY

## 2022-03-22 PROCEDURE — 2709999900 HC NON-CHARGEABLE SUPPLY: Performed by: SURGERY

## 2022-03-22 PROCEDURE — 74011000250 HC RX REV CODE- 250: Performed by: NURSE ANESTHETIST, CERTIFIED REGISTERED

## 2022-03-22 RX ORDER — ALBUTEROL SULFATE 0.83 MG/ML
2.5 SOLUTION RESPIRATORY (INHALATION) AS NEEDED
Status: CANCELLED | OUTPATIENT
Start: 2022-03-22

## 2022-03-22 RX ORDER — MIDAZOLAM HYDROCHLORIDE 1 MG/ML
2 INJECTION, SOLUTION INTRAMUSCULAR; INTRAVENOUS
Status: CANCELLED | OUTPATIENT
Start: 2022-03-22 | End: 2022-03-23

## 2022-03-22 RX ORDER — NALOXONE HYDROCHLORIDE 0.4 MG/ML
0.1 INJECTION, SOLUTION INTRAMUSCULAR; INTRAVENOUS; SUBCUTANEOUS AS NEEDED
Status: CANCELLED | OUTPATIENT
Start: 2022-03-22

## 2022-03-22 RX ORDER — PROPOFOL 10 MG/ML
INJECTION, EMULSION INTRAVENOUS AS NEEDED
Status: DISCONTINUED | OUTPATIENT
Start: 2022-03-22 | End: 2022-03-22 | Stop reason: HOSPADM

## 2022-03-22 RX ORDER — HYDROMORPHONE HYDROCHLORIDE 1 MG/ML
0.5 INJECTION, SOLUTION INTRAMUSCULAR; INTRAVENOUS; SUBCUTANEOUS
Status: CANCELLED | OUTPATIENT
Start: 2022-03-22

## 2022-03-22 RX ORDER — LIDOCAINE HYDROCHLORIDE 10 MG/ML
0.1 INJECTION INFILTRATION; PERINEURAL AS NEEDED
Status: CANCELLED | OUTPATIENT
Start: 2022-03-22

## 2022-03-22 RX ORDER — MIDAZOLAM HYDROCHLORIDE 1 MG/ML
2 INJECTION, SOLUTION INTRAMUSCULAR; INTRAVENOUS ONCE
Status: CANCELLED | OUTPATIENT
Start: 2022-03-22 | End: 2022-03-22

## 2022-03-22 RX ORDER — FENTANYL CITRATE 50 UG/ML
100 INJECTION, SOLUTION INTRAMUSCULAR; INTRAVENOUS ONCE
Status: CANCELLED | OUTPATIENT
Start: 2022-03-22 | End: 2022-03-22

## 2022-03-22 RX ORDER — EPHEDRINE SULFATE/0.9% NACL/PF 50 MG/5 ML
SYRINGE (ML) INTRAVENOUS AS NEEDED
Status: DISCONTINUED | OUTPATIENT
Start: 2022-03-22 | End: 2022-03-22 | Stop reason: HOSPADM

## 2022-03-22 RX ORDER — PROPOFOL 10 MG/ML
INJECTION, EMULSION INTRAVENOUS
Status: DISCONTINUED | OUTPATIENT
Start: 2022-03-22 | End: 2022-03-22 | Stop reason: HOSPADM

## 2022-03-22 RX ORDER — DIPHENHYDRAMINE HYDROCHLORIDE 50 MG/ML
12.5 INJECTION, SOLUTION INTRAMUSCULAR; INTRAVENOUS
Status: CANCELLED | OUTPATIENT
Start: 2022-03-22 | End: 2022-03-23

## 2022-03-22 RX ORDER — ONDANSETRON 2 MG/ML
4 INJECTION INTRAMUSCULAR; INTRAVENOUS ONCE
Status: CANCELLED | OUTPATIENT
Start: 2022-03-22 | End: 2022-03-22

## 2022-03-22 RX ORDER — OXYCODONE HYDROCHLORIDE 5 MG/1
5 TABLET ORAL
Status: CANCELLED | OUTPATIENT
Start: 2022-03-22 | End: 2022-03-23

## 2022-03-22 RX ORDER — SODIUM CHLORIDE, SODIUM LACTATE, POTASSIUM CHLORIDE, CALCIUM CHLORIDE 600; 310; 30; 20 MG/100ML; MG/100ML; MG/100ML; MG/100ML
INJECTION, SOLUTION INTRAVENOUS
Status: DISCONTINUED | OUTPATIENT
Start: 2022-03-22 | End: 2022-03-22 | Stop reason: HOSPADM

## 2022-03-22 RX ORDER — SODIUM CHLORIDE, SODIUM LACTATE, POTASSIUM CHLORIDE, CALCIUM CHLORIDE 600; 310; 30; 20 MG/100ML; MG/100ML; MG/100ML; MG/100ML
100 INJECTION, SOLUTION INTRAVENOUS CONTINUOUS
Status: CANCELLED | OUTPATIENT
Start: 2022-03-22 | End: 2022-03-23

## 2022-03-22 RX ORDER — OXYCODONE HYDROCHLORIDE 5 MG/1
10 TABLET ORAL
Status: CANCELLED | OUTPATIENT
Start: 2022-03-22 | End: 2022-03-23

## 2022-03-22 RX ORDER — SODIUM CHLORIDE, SODIUM LACTATE, POTASSIUM CHLORIDE, CALCIUM CHLORIDE 600; 310; 30; 20 MG/100ML; MG/100ML; MG/100ML; MG/100ML
100 INJECTION, SOLUTION INTRAVENOUS CONTINUOUS
Status: CANCELLED | OUTPATIENT
Start: 2022-03-22

## 2022-03-22 RX ADMIN — PROPOFOL 140 MCG/KG/MIN: 10 INJECTION, EMULSION INTRAVENOUS at 10:27

## 2022-03-22 RX ADMIN — SODIUM CHLORIDE, SODIUM LACTATE, POTASSIUM CHLORIDE, AND CALCIUM CHLORIDE: 600; 310; 30; 20 INJECTION, SOLUTION INTRAVENOUS at 10:16

## 2022-03-22 RX ADMIN — Medication 15 MG: at 10:51

## 2022-03-22 RX ADMIN — PROPOFOL 50 MG: 10 INJECTION, EMULSION INTRAVENOUS at 10:31

## 2022-03-22 RX ADMIN — PROPOFOL 50 MG: 10 INJECTION, EMULSION INTRAVENOUS at 10:27

## 2022-03-22 NOTE — DISCHARGE INSTRUCTIONS
Gastrointestinal Colonoscopy/Flexible Sigmoidoscopy - Lower Exam Discharge Instructions  1. Call Dr. Cindy Chaudhry at [phone] for any problems or questions. 2. Contact the doctors office for follow up appointment as directed  3. Medication may cause drowsiness for several hours, therefore:  · Do not drive or operate machinery for reminder of the day. · No alcohol today. · Do not make any important or legal decisions for 24 hours. · Do not sign any legal documents for 24 hours. 4. Ordinarily, you may resume regular diet and activity after exam unless otherwise specified by your physician. 5. Because of air put into your colon during exam, you may experience some abdominal distension, relieved by the passage of gas, for several hours. 6. Contact your physician if you have any of the following:  · Excessive amount of bleeding - large amount when having a bowel movement.   Occasional specks of blood with bowel movement would not be unusual.  · Severe abdominal pain  · Fever or Chills

## 2022-03-22 NOTE — OP NOTES
[facility]  OPERATIVE REPORT    Name:  Dmitriy Mcgee  MR#:  [id number]  :  1960  ACCOUNT #:  [de-identified]  DATE OF SERVICE:  2022    PREOPERATIVE DIAGNOSIS:  In need of screening colonoscopy. POSTOPERATIVE DIAGNOSIS:  Scattered diverticulosis. PROCEDURE PERFORMED:  Colonoscopy. SURGEON:  Jose Ramon Simpson MD    ANESTHESIA: mac    COMPLICATIONS:  none    SPECIMENS REMOVED:  none    INDICATION:  This is a 28-year-old female who presented with request for screening colonoscopy. She denied any lower GI symptoms. She understood risks and benefits, agreed to proceed. FINDING:  She has a tortous colon with scattered diverticulosis. Otherwise, this is a normal colonoscopy. Her bowel prep is satisfactory. PROCEDURE:  After informed consent was obtained, the patient was brought into the GI suite. Managed anesthesia care was administered and the patient was left in left decubitus position. Digital rectal exam was performed which was normal.  Then the regular colonoscope was used to advance under direct vision. With careful manipulations, the scope was able to advance all the way into the cecum. Appendiceal orifice and ileocecal valve were clearly identified. Then the scope was withdrawn, and overall, this was a normal colonoscopy except for a few scattered diverticula. I saw no evidence of bleeding. No evidence of polyp or inflammation. Then the scope was withdrawn. The patient tolerated the procedure well, transferred to the recovery room in stable condition. RECOMMENDATIONS:  I think this colonoscopy is good for 10 years unless she develops early symptoms.       Krishna Penny MD      [id number]  D:  2022 12:03  T:  2022 19:25  JOB #:  [id number]

## 2022-03-22 NOTE — ANESTHESIA POSTPROCEDURE EVALUATION
Procedure(s):  COLONOSCOPY/ 23.    total IV anesthesia    Anesthesia Post Evaluation      Multimodal analgesia: multimodal analgesia used between 6 hours prior to anesthesia start to PACU discharge  Patient location during evaluation: PACU  Patient participation: complete - patient participated  Level of consciousness: awake and awake and alert  Pain management: adequate  Airway patency: patent  Anesthetic complications: no  Cardiovascular status: acceptable  Respiratory status: acceptable  Hydration status: acceptable  Post anesthesia nausea and vomiting:  controlled      INITIAL Post-op Vital signs:   Vitals Value Taken Time   /61 03/22/22 1115   Temp 36 °C (96.8 °F) 03/22/22 1054   Pulse 66 03/22/22 1120   Resp 16 03/22/22 1105   SpO2 99 % 03/22/22 1119   Vitals shown include unvalidated device data.

## 2022-03-22 NOTE — ANESTHESIA PREPROCEDURE EVALUATION
Anesthetic History   No history of anesthetic complications            Review of Systems / Medical History  Patient summary reviewed and pertinent labs reviewed    Pulmonary  Within defined limits                 Neuro/Psych   Within defined limits           Cardiovascular              Hyperlipidemia    Exercise tolerance: >4 METS     GI/Hepatic/Renal  Within defined limits              Endo/Other  Within defined limits           Other Findings   Comments: H/O - Abdominal mass  Insomnia  Anxiety  Cervical fusion--2 level           Physical Exam    Airway  Mallampati: I  TM Distance: 4 - 6 cm  Neck ROM: normal range of motion   Mouth opening: Normal     Cardiovascular  Regular rate and rhythm,  S1 and S2 normal,  no murmur, click, rub, or gallop  Rhythm: regular  Rate: normal         Dental    Dentition: Bridges  Comments: Upper bridge   Pulmonary  Breath sounds clear to auscultation               Abdominal  GI exam deferred       Other Findings            Anesthetic Plan    ASA: 2  Anesthesia type: total IV anesthesia - [place] block          Induction: Intravenous  Anesthetic plan and risks discussed with: Patient

## 2022-03-22 NOTE — H&P
Ricki Gallo MD (Physician) [facility]  [place] [facility]  3 . [address] [place] [address]  Shruthi Davis, [address] [facility]  [phone]        Patient:  Roxanna Rodriguez  : 1960     CLAYTON Rodriguez is a 62 y.o. female who is seen for colonoscopy. Her last colonoscopy was 10 years ago. She denies any lower GI symptoms, especially no rectal bleeding, no black stool. No constipation/diarrhea.      Other medical issues are reviewed as below. Non smoker, drink socially. No blood thinners. Father had multiple myeloma, grandfather had prostate cancer.              Past Medical History:   Diagnosis Date    Anxiety disorder 2015    Cervical disc disorder 2015    CRP elevated 2015    Depression 2015    Fracture 8/10/15     left foot    Insomnia       at times    Psychiatric disorder       mild depression, takes meds    Pure hypercholesterolemia 2015             Current Outpatient Medications   Medication Sig Dispense Refill    escitalopram oxalate (LEXAPRO) 20 mg tablet Take 1 Tablet by mouth daily. 90 Tablet 3    acetaminophen (TylenoL) 325 mg tablet Take 325 mg by mouth every four (4) hours as needed for Pain.        XIIDRA 5 % dpet          cholecalciferol (VITAMIN D3) 1,000 unit tablet Take  by mouth daily. Last dose 17        omega-3 fatty acids-vitamin e (FISH OIL) 1,000 mg cap Take 1 Cap by mouth daily. Last dose 17        MULTI-VITAMIN PO Take 1 Tab by mouth nightly. Last dose 17        traMADoL (ULTRAM) 50 mg tablet Take 1 Tablet by mouth every eight (8) hours as needed for Pain for up to 30 days. Max Daily Amount: 150 mg. Indications: pain 90 Tablet 5    zolpidem (AMBIEN) 5 mg tablet Take 1 Tablet by mouth nightly as needed for Sleep for up to 90 days. Max Daily Amount: 5 mg.  90 Tablet 1            Allergies   Allergen Reactions    Mobic [Meloxicam] Other (comments)       Angio edema  Tongue swells    Biaxin [Clarithromycin] Unknown (comments)       heartburns            Past Surgical History:   Procedure Laterality Date    HX APPENDECTOMY   1978    HX CERVICAL FUSION   2010     with plate    HX COLONOSCOPY        HX DILATION AND CURETTAGE        HX GYN   1994     endometrial tumor (non-cancerous)    HX GYN         ablation uterine    HX ORTHOPAEDIC   2005     cervical fusion    HX ORTHOPAEDIC Left 12/10/2020     wrist    HX PELVIC LAPAROSCOPY   2000     to remove scar tissue from endometrial tumor    [facility] ABDOMEN SURGERY PROC UNLISTED   02/27/2017     mass removed            Family History   Problem Relation Age of Onset    Hypertension Father      Heart Disease Father           had CABG- no MI    Cancer Father      Diabetes Father      Lung Disease Mother      No Known Problems Sister      No Known Problems Brother        Social History            Tobacco Use    Smoking status: Never Smoker    Smokeless tobacco: Never Used   Substance Use Topics    Alcohol use: Yes       Alcohol/week: 1.7 standard drinks       Types: 2 Standard drinks or equivalent per week       Comment: occas         Review of Systems   A comprehensive review of systems was negative except for that written in the HPI.      Physical Exam  Visit Vitals  /72   Pulse 73   Ht 5' 4\" (1.626 m)   Wt 135 lb (61.2 kg)   SpO2 96%   BMI 23.17 kg/m²         General:          Alert, oriented, cooperative, awake patient in no acute distress   Skin:                Warm, moist with good texture   Eyes:               Sclera are clear, extraocular muscles intact  HENT:             Normocephalic; oral mucosa moist, nares patent; neck is supple; trachea midline  Respiratory:     Lungs clear to auscultation bilaterally, breathing is non-labored   Chest:              Symmetric throughout one respiratory excursion; no supraclavicular lymphadenopathy  CV:                  Regular rate and rhythm, no appreciable murmurs, rubs, gallops  Abdomen:        Soft, protuberant but non-distended; bowel sounds are normoactive   Extremities:     No cyanosis, clubbing or edema  Neurological:   No focal signs        Labs:         Lab Results   Component Value Date/Time     aPTT 26.4 02/23/2017 09:20 AM     Prothrombin time 10.4 02/23/2017 09:20 AM     INR 1.0 02/23/2017 09:20 AM         Assessment/Plan:   Montrell Hooper is a 62 y.o. female who is due for screening colonoscopy.  Will arrange.   Elmer Mtz MD

## 2022-06-02 ENCOUNTER — CLINICAL DOCUMENTATION (OUTPATIENT)
Dept: ORTHOPEDIC SURGERY | Age: 62
End: 2022-06-02

## 2022-06-02 NOTE — PROGRESS NOTES
[address] [facility]  [place] [address]  Dept: [phone]      Physical Therapy Discharge/Discontinuation Note     Referring MD: Bhavya Wesley MD  Date of Initial Evaluation: 3/18/ 22  Number of visits: 6  Number of cancellations: 3  Number of no shows: 1    Patient has discontinued therapy as patient stated her primary complaint has resolved and she no longer needs to receive treatment. Patient was  last seen for PT services on 4/13/22. At that time, the patient appeared to be progressing well with therapy treatment. Therapy goals were partially met and patient demonstrated good recall of HEP. Please see previous notes for objective findings.

## 2022-06-09 ENCOUNTER — TELEPHONE (OUTPATIENT)
Dept: FAMILY MEDICINE CLINIC | Facility: CLINIC | Age: 62
End: 2022-06-09

## 2022-06-09 NOTE — TELEPHONE ENCOUNTER
Called and spoke with patient, gave information as per Dr Jennifer Black , for her controlled medications, will call patient if any cancellations, patient upset that she cannot receive her refills and she was not the one to cancel appointments , patient will send a my chart message to Dr Jennifer Black about her concerns

## 2022-06-09 NOTE — TELEPHONE ENCOUNTER
Patient left message, stated her appt was rescheduled and requesting refill for medication    Ambien # 30 no RF by NP 5/6/2022  Tramadol # 90 No RF by Dr Brad Ferguson 5/10/2022

## 2022-06-15 ENCOUNTER — TELEPHONE (OUTPATIENT)
Dept: FAMILY MEDICINE CLINIC | Facility: CLINIC | Age: 62
End: 2022-06-15

## 2022-06-15 ENCOUNTER — OFFICE VISIT (OUTPATIENT)
Dept: FAMILY MEDICINE CLINIC | Facility: CLINIC | Age: 62
End: 2022-06-15
Payer: COMMERCIAL

## 2022-06-15 VITALS
SYSTOLIC BLOOD PRESSURE: 126 MMHG | HEIGHT: 64 IN | DIASTOLIC BLOOD PRESSURE: 68 MMHG | HEART RATE: 77 BPM | BODY MASS INDEX: 22.53 KG/M2 | OXYGEN SATURATION: 98 % | WEIGHT: 132 LBS

## 2022-06-15 DIAGNOSIS — Z13.1 SCREENING FOR DIABETES MELLITUS: ICD-10-CM

## 2022-06-15 DIAGNOSIS — M25.50 ARTHRALGIA OF MULTIPLE SITES, BILATERAL: ICD-10-CM

## 2022-06-15 DIAGNOSIS — G89.29 CHRONIC NECK PAIN WITH NORMAL NEUROLOGICAL EXAMINATION: ICD-10-CM

## 2022-06-15 DIAGNOSIS — M54.2 CHRONIC NECK PAIN WITH NORMAL NEUROLOGICAL EXAMINATION: ICD-10-CM

## 2022-06-15 DIAGNOSIS — F51.01 PRIMARY INSOMNIA: ICD-10-CM

## 2022-06-15 DIAGNOSIS — M72.2 PLANTAR FASCIITIS, BILATERAL: ICD-10-CM

## 2022-06-15 DIAGNOSIS — Z79.899 HIGH RISK MEDICATION USE: ICD-10-CM

## 2022-06-15 DIAGNOSIS — E78.00 HYPERCHOLESTEROLEMIA: ICD-10-CM

## 2022-06-15 DIAGNOSIS — Z79.899 CONTROLLED SUBSTANCE AGREEMENT SIGNED: ICD-10-CM

## 2022-06-15 DIAGNOSIS — F33.0 MILD EPISODE OF RECURRENT MAJOR DEPRESSIVE DISORDER (HCC): ICD-10-CM

## 2022-06-15 DIAGNOSIS — M50.90 CERVICAL DISC DISORDER: ICD-10-CM

## 2022-06-15 DIAGNOSIS — R52 PAIN MANAGEMENT: ICD-10-CM

## 2022-06-15 DIAGNOSIS — Z00.00 WELL ADULT EXAM: Primary | ICD-10-CM

## 2022-06-15 PROBLEM — Z13.220 SCREENING, LIPID: Status: ACTIVE | Noted: 2022-06-15

## 2022-06-15 LAB
ANION GAP SERPL CALC-SCNC: 9 MMOL/L (ref 7–16)
BUN SERPL-MCNC: 11 MG/DL (ref 8–23)
CALCIUM SERPL-MCNC: 9.3 MG/DL (ref 8.3–10.4)
CHLORIDE SERPL-SCNC: 100 MMOL/L (ref 98–107)
CHOLEST SERPL-MCNC: 210 MG/DL
CO2 SERPL-SCNC: 27 MMOL/L (ref 21–32)
CREAT SERPL-MCNC: 0.8 MG/DL (ref 0.6–1)
GLUCOSE SERPL-MCNC: 86 MG/DL (ref 65–100)
HDLC SERPL-MCNC: 63 MG/DL (ref 40–60)
HDLC SERPL: 3.3 {RATIO}
LDLC SERPL CALC-MCNC: 137.2 MG/DL
POTASSIUM SERPL-SCNC: 4.2 MMOL/L (ref 3.5–5.1)
SODIUM SERPL-SCNC: 136 MMOL/L (ref 136–145)
TRIGL SERPL-MCNC: 49 MG/DL (ref 35–150)
VLDLC SERPL CALC-MCNC: 9.8 MG/DL (ref 6–23)

## 2022-06-15 PROCEDURE — 99396 PREV VISIT EST AGE 40-64: CPT | Performed by: FAMILY MEDICINE

## 2022-06-15 RX ORDER — ZOLPIDEM TARTRATE 5 MG/1
5 TABLET ORAL NIGHTLY PRN
Qty: 90 TABLET | Refills: 1 | Status: SHIPPED | OUTPATIENT
Start: 2022-06-15 | End: 2022-06-15 | Stop reason: SDUPTHER

## 2022-06-15 RX ORDER — ZOLPIDEM TARTRATE 5 MG/1
5 TABLET ORAL NIGHTLY PRN
Qty: 90 TABLET | Refills: 1 | Status: SHIPPED | OUTPATIENT
Start: 2022-06-15 | End: 2022-12-12

## 2022-06-15 RX ORDER — TRAMADOL HYDROCHLORIDE 50 MG/1
50 TABLET ORAL EVERY 8 HOURS PRN
Qty: 90 TABLET | Refills: 5 | Status: SHIPPED | OUTPATIENT
Start: 2022-06-15 | End: 2022-06-15 | Stop reason: SDUPTHER

## 2022-06-15 RX ORDER — TRAMADOL HYDROCHLORIDE 50 MG/1
TABLET ORAL
COMMUNITY
Start: 2022-05-14 | End: 2022-06-15 | Stop reason: SDUPTHER

## 2022-06-15 RX ORDER — CHLORAL HYDRATE 500 MG
1000 CAPSULE ORAL DAILY
COMMUNITY

## 2022-06-15 RX ORDER — TRAMADOL HYDROCHLORIDE 50 MG/1
50 TABLET ORAL EVERY 8 HOURS PRN
Qty: 90 TABLET | Refills: 5 | Status: SHIPPED | OUTPATIENT
Start: 2022-06-15 | End: 2022-12-12

## 2022-06-15 NOTE — TELEPHONE ENCOUNTER
Received call from patient , stated cost of Ambien at [facility] is $90.00  Cost of Ambien at [facility] $21.00  Tried to transfer Randy Ritter told he since RX had not been filled at [facility], [facility] must received new Ambien RX

## 2022-06-15 NOTE — PROGRESS NOTES
5' 4\" (1.626 m)   Wt 132 lb (59.9 kg)   SpO2 98%   BMI 22.66 kg/m²     Physical Exam    On this date @encounterdate@ I have spent *** minutes reviewing previous notes, lab/imaging results and face to face with the patient discussing the diagnoses and importance of compliance with the treatment plan, as well as documenting on the day of the visit.         Juan Carlos Jiménez,   [facility] [place]

## 2022-06-15 NOTE — TELEPHONE ENCOUNTER
I will send new RX to [facility].  Please cancel RX at [facility]. Notify patient

## 2022-06-15 NOTE — TELEPHONE ENCOUNTER
Called [facility], Ambien RX cancelled as per Dr Luzmaria Dixon, patient notified he ill send a new Ambien RX to [facility]

## 2022-06-26 ASSESSMENT — ENCOUNTER SYMPTOMS
EYE PAIN: 0
CONSTIPATION: 0
BACK PAIN: 0
VOMITING: 0
DIARRHEA: 0
ABDOMINAL PAIN: 0
WHEEZING: 0
COUGH: 0
SHORTNESS OF BREATH: 0
SORE THROAT: 0

## 2022-06-26 NOTE — PROGRESS NOTES
Richard Szymanski DO                Diplomate of the [employer] of [facility] [place]         [phone]    Roland Styles is a 65 y.o. female who was seen on 6/15/2022 for   Chief Complaint   Patient presents with    Insomnia    Neck Pain     chronic       Assessment & Plan     Diagnosis Orders   1. Well adult exam  Basic Metabolic Panel    Lipid Panel    Lipid Panel    Basic Metabolic Panel   2. Hypercholesterolemia     3. Mild episode of recurrent major depressive disorder ([address]     4. Primary insomnia  traMADol (ULTRAM) 50 MG tablet    zolpidem (AMBIEN) 5 MG tablet    DISCONTINUED: zolpidem (AMBIEN) 5 MG tablet    DISCONTINUED: traMADol (ULTRAM) 50 MG tablet   5. Pain management     6. Controlled substance agreement signed     7. Cervical disc disorder  traMADol (ULTRAM) 50 MG tablet    DISCONTINUED: traMADol (ULTRAM) 50 MG tablet   8. Chronic neck pain with normal neurological examination     9. Arthralgia of multiple sites, bilateral     10. Plantar fasciitis, bilateral  traMADol (ULTRAM) 50 MG tablet    DISCONTINUED: traMADol (ULTRAM) 50 MG tablet   11. Screening for diabetes mellitus  Basic Metabolic Panel    Basic Metabolic Panel   12. High risk medication use         No problem-specific Assessment & Plan notes found for this encounter. Follow-up and Dispositions    · Return in about 6 months (around 12/10/2022) for NO PERNELL, LABS TODAY.          RECENT LABS/TESTS TO REVIEW and DISCUSS    Results for orders placed or performed in visit on 06/15/22   Lipid Panel   Result Value Ref Range    Cholesterol, Total 210 (H) <200 MG/DL    Triglycerides 49 35 - 150 MG/DL    HDL 63 (H) 40 - 60 MG/DL    LDL Calculated 137.2 (H) <100 MG/DL    VLDL Cholesterol Calculated 9.8 6.0 - 23.0 MG/DL    Chol/HDL Ratio 3.3     Basic Metabolic Panel   Result Value Ref Range    Sodium 136 136 - 145 mmol/L    Potassium 4.2 3.5 - 5.1 mmol/L Chloride 100 98 - 107 mmol/L    CO2 27 21 - 32 mmol/L    Anion Gap 9 7 - 16 mmol/L    Glucose 86 65 - 100 mg/dL    BUN 11 8 - 23 MG/DL    CREATININE 0.80 0.6 - 1.0 MG/DL    GFR African American >60 >60 ml/min/1.73m2    GFR Non- >60 >60 ml/min/1.73m2    Calcium 9.3 8.3 - 10.4 MG/DL     Component      Latest Ref Rng & Units 6/15/2022 3/25/2021 2/27/2020          10:12 AM  8:41 AM 12:27 PM   CHOLESTEROL, TOTAL, 692136      <200 MG/ (H) 231 (H) 203 (H)   Triglycerides      35 - 150 MG/DL 49 58 53   HDL Cholesterol      40 - 60 MG/DL 63 (H) 73 22 (L)   LDL Calculated      <100 MG/.2 (H) 148 (H) 170 (H)   VLDL Cholesterol Calculated      6.0 - 23.0 MG/DL 9.8  11   Chol/HDL Ratio       3.3     VLDL      5 - 40 mg/dL  10        Subjective    HPI: Please see my assessment and plan notes (above) for individual problems addressed today. Other important information: This is a 61-year-old female patient who is here today for a complete physical and follow-up on several medical problems including insomnia, chronic neck pain, hypercholesterolemia. The patient did not have labs in advance of today's visit. Instead we will draw labs today or at a future date. Reviewing today's labs indicates gradual improvement over the past several years and her LDL cholesterol now at 137 (148) (170). Her HDL remains fairly high as always at 63 (73). Her ASCVD risk score remains only 3.4% as below. She had a coronary calcium score in 2013 which was 0. I will suggest repeating that test once next year. If it is still 0 then I suspect she will never be a statin candidate.     The 10-year ASCVD risk score (Indy Herrmann, et al., 2013) is: 3.4%    Values used to calculate the score:      Age: 65 years      Sex: Female      Is Non- : No      Diabetic: No      Tobacco smoker: No      Systolic Blood Pressure: 001 mmHg      Is BP treated: No      HDL Cholesterol: 63 MG/DL      Total Cholesterol: 210 MG/DL       Reviewed and updated this visit by provider:           Review of Systems   Constitutional: Negative for fever and unexpected weight change. HENT: Negative for congestion, ear pain and sore throat. Eyes: Negative for pain. Respiratory: Negative for cough, shortness of breath and wheezing. Cardiovascular: Negative for chest pain, palpitations and leg swelling. Gastrointestinal: Negative for abdominal pain, constipation, diarrhea and vomiting. Genitourinary: Negative for difficulty urinating, dysuria and frequency. Musculoskeletal: Negative for arthralgias, back pain and myalgias. Skin: Negative for rash. Neurological: Negative for dizziness, weakness and headaches. Psychiatric/Behavioral: Negative for confusion and dysphoric mood. The patient is not nervous/anxious. All other systems reviewed and are negative. Objective    /68 (Site: Left Upper Arm, Position: Sitting, Cuff Size: Medium Adult)   Pulse 77   Ht 5' 4\" (1.626 m)   Wt 132 lb (59.9 kg)   SpO2 98%   BMI 22.66 kg/m²     Physical Exam  Vitals reviewed. Constitutional:       General: She is not in acute distress. Appearance: Normal appearance. HENT:      Head: Normocephalic and atraumatic. Right Ear: Tympanic membrane normal.      Left Ear: Tympanic membrane normal.      Mouth/Throat:      Mouth: Mucous membranes are moist.   Eyes:      Extraocular Movements: Extraocular movements intact. Conjunctiva/sclera: Conjunctivae normal.      Pupils: Pupils are equal, round, and reactive to light. Neck:      Vascular: No carotid bruit. Cardiovascular:      Rate and Rhythm: Normal rate and regular rhythm. Heart sounds: Normal heart sounds. Pulmonary:      Effort: Pulmonary effort is normal.      Breath sounds: Normal breath sounds. Abdominal:      Palpations: Abdomen is soft. There is no mass. Tenderness: There is no abdominal tenderness.    Musculoskeletal: Cervical back: No tenderness. Lymphadenopathy:      Cervical: No cervical adenopathy. Neurological:      General: No focal deficit present. Mental Status: She is alert and oriented to person, place, and time. Psychiatric:         Mood and Affect: Mood normal.         Behavior: Behavior normal.         Thought Content: Thought content normal.         Judgment: Judgment normal.         On this date 06/15/2022 I have spent 34 minutes reviewing previous notes, lab/imaging results and face to face with the patient discussing the diagnoses and importance of compliance with the treatment plan, as well as documenting on the day of the visit.         Leonardo Marcial DO  [facility] [place]

## 2022-07-15 PROBLEM — Z13.220 SCREENING, LIPID: Status: RESOLVED | Noted: 2022-06-15 | Resolved: 2022-07-15

## 2022-08-13 DIAGNOSIS — F32.0 MAJOR DEPRESSIVE DISORDER, SINGLE EPISODE, MILD (HCC): ICD-10-CM

## 2022-08-15 RX ORDER — ESCITALOPRAM OXALATE 20 MG/1
TABLET ORAL
Qty: 90 TABLET | Refills: 3 | OUTPATIENT
Start: 2022-08-15

## 2022-10-06 ENCOUNTER — PATIENT MESSAGE (OUTPATIENT)
Dept: FAMILY MEDICINE CLINIC | Facility: CLINIC | Age: 62
End: 2022-10-06

## 2022-10-06 DIAGNOSIS — F32.0 MAJOR DEPRESSIVE DISORDER, SINGLE EPISODE, MILD (HCC): Primary | ICD-10-CM

## 2022-10-07 RX ORDER — ESCITALOPRAM OXALATE 20 MG/1
20 TABLET ORAL DAILY
Qty: 90 TABLET | Refills: 3 | Status: SHIPPED | OUTPATIENT
Start: 2022-10-07

## 2022-11-17 ENCOUNTER — OFFICE VISIT (OUTPATIENT)
Dept: FAMILY MEDICINE CLINIC | Facility: CLINIC | Age: 62
End: 2022-11-17
Payer: COMMERCIAL

## 2022-11-17 VITALS
BODY MASS INDEX: 23.9 KG/M2 | HEIGHT: 64 IN | OXYGEN SATURATION: 98 % | SYSTOLIC BLOOD PRESSURE: 110 MMHG | WEIGHT: 140 LBS | DIASTOLIC BLOOD PRESSURE: 66 MMHG | HEART RATE: 75 BPM

## 2022-11-17 DIAGNOSIS — Z00.00 WELL ADULT EXAM: ICD-10-CM

## 2022-11-17 DIAGNOSIS — Z11.4 SCREENING FOR HIV WITHOUT PRESENCE OF RISK FACTORS: Primary | ICD-10-CM

## 2022-11-17 DIAGNOSIS — Z79.899 HIGH RISK MEDICATION USE: ICD-10-CM

## 2022-11-17 DIAGNOSIS — Z13.1 SCREENING FOR DIABETES MELLITUS: ICD-10-CM

## 2022-11-17 DIAGNOSIS — F51.01 PRIMARY INSOMNIA: ICD-10-CM

## 2022-11-17 DIAGNOSIS — E78.5 DYSLIPIDEMIA: ICD-10-CM

## 2022-11-17 PROCEDURE — 99213 OFFICE O/P EST LOW 20 MIN: CPT | Performed by: FAMILY MEDICINE

## 2022-11-17 RX ORDER — ZOLPIDEM TARTRATE 5 MG/1
5 TABLET ORAL NIGHTLY PRN
Qty: 90 TABLET | Refills: 1 | Status: SHIPPED | OUTPATIENT
Start: 2022-12-14 | End: 2023-06-12

## 2022-11-17 ASSESSMENT — PATIENT HEALTH QUESTIONNAIRE - PHQ9
SUM OF ALL RESPONSES TO PHQ QUESTIONS 1-9: 0
SUM OF ALL RESPONSES TO PHQ QUESTIONS 1-9: 0
1. LITTLE INTEREST OR PLEASURE IN DOING THINGS: 0
2. FEELING DOWN, DEPRESSED OR HOPELESS: 0
SUM OF ALL RESPONSES TO PHQ QUESTIONS 1-9: 0
SUM OF ALL RESPONSES TO PHQ9 QUESTIONS 1 & 2: 0
SUM OF ALL RESPONSES TO PHQ QUESTIONS 1-9: 0

## 2022-11-17 ASSESSMENT — ENCOUNTER SYMPTOMS
COUGH: 0
CONSTIPATION: 0
WHEEZING: 0
VOMITING: 0
SORE THROAT: 0
BACK PAIN: 0
EYE PAIN: 0
SHORTNESS OF BREATH: 0
ABDOMINAL PAIN: 0
DIARRHEA: 0

## 2022-11-17 NOTE — PROGRESS NOTES
Kermit Mixon DO                Diplomate of the [employer] of [facility] [place]         [phone]    Miracle Veloz is a 65 y.o. female who was seen on 11/17/2022 for   Chief Complaint   Patient presents with    Insomnia    Other     Declines flu vaccine         Assessment & Plan     Diagnosis Orders   1. Pain management  traMADol (ULTRAM) 50 MG tablet      2. Cervical disc disorder  traMADol (ULTRAM) 50 MG tablet      3. Chronic neck pain with normal neurological examination  traMADol (ULTRAM) 50 MG tablet      4. Controlled substance agreement signed      MISSED today. Needs drug screen next visit      5. Medication monitoring encounter  AMB POC DRUG SCREEN, URINE      6. Encounter for drug screening  AMB POC DRUG SCREEN, URINE      7. Primary insomnia  zolpidem (AMBIEN) 5 MG tablet    traMADol (ULTRAM) 50 MG tablet    Refill ambien      8. Dyslipidemia  Lipid Panel    Recheck lipids      9. Screening for HIV without presence of risk factors        10. Screening for diabetes mellitus  Comprehensive Metabolic Panel      11. High risk medication use  Comprehensive Metabolic Panel      12. Refused influenza vaccine        13. Well adult exam  Lipid Panel    Comprehensive Metabolic Panel    Diagnosis for future labs          Follow-up and Dispositions    Return in about 7 months (around 6/14/2023) for CPE PERNELL. RECENT LABS/TESTS TO REVIEW and DISCUSS    No results found for this visit on 11/17/22. Subjective    HPI:     This is a 79-year-old female patient here today for refills on sleeping medication and pain medication. Unfortunately due to some error with programming, the patient was contacted and her appointment which was scheduled next months was moved up 1 month. She is really not due for her medication refills yet.   I will go ahead and refill them for a future date and hope that she will not run out of her medication before approximately 7 months from now. She decided she wanted to wait to refill her tramadol until she went home to count the number of pills remaining. She contacted me back later in the day to report:    \"Just wanted to let you know that I have 63 tablets in my Tramadol prescription. \"       Reviewed and updated this visit by provider:           Review of Systems   Constitutional:  Negative for fever and unexpected weight change. HENT:  Negative for congestion, ear pain and sore throat. Eyes:  Negative for pain. Respiratory:  Negative for cough, shortness of breath and wheezing. Cardiovascular:  Negative for chest pain, palpitations and leg swelling. Gastrointestinal:  Negative for abdominal pain, constipation, diarrhea and vomiting. Genitourinary:  Negative for difficulty urinating, dysuria and frequency. Musculoskeletal:  Positive for neck pain. Negative for arthralgias, back pain and myalgias. Skin:  Negative for rash. Neurological:  Negative for dizziness, weakness and headaches. Psychiatric/Behavioral:  Positive for sleep disturbance. Negative for confusion and dysphoric mood. The patient is not nervous/anxious. All other systems reviewed and are negative. Objective    /66 (Site: Left Upper Arm, Position: Sitting, Cuff Size: Medium Adult)   Pulse 75   Ht 5' 4\" (1.626 m)   Wt 140 lb (63.5 kg)   SpO2 98%   BMI 24.03 kg/m²     Physical Exam  Vitals reviewed. Constitutional:       General: She is not in acute distress. Appearance: Normal appearance. HENT:      Head: Normocephalic and atraumatic. Right Ear: Tympanic membrane normal.      Left Ear: Tympanic membrane normal.      Mouth/Throat:      Mouth: Mucous membranes are moist.   Eyes:      Extraocular Movements: Extraocular movements intact. Conjunctiva/sclera: Conjunctivae normal.      Pupils: Pupils are equal, round, and reactive to light. Neck:      Vascular: No carotid bruit. Cardiovascular:      Rate and Rhythm: Normal rate and regular rhythm. Heart sounds: Normal heart sounds. Pulmonary:      Effort: Pulmonary effort is normal.      Breath sounds: Normal breath sounds. Abdominal:      Palpations: Abdomen is soft. There is no mass. Tenderness: There is no abdominal tenderness. Musculoskeletal:      Cervical back: No tenderness. Lymphadenopathy:      Cervical: No cervical adenopathy. Neurological:      General: No focal deficit present. Mental Status: She is alert and oriented to person, place, and time. Psychiatric:         Mood and Affect: Mood normal.         Behavior: Behavior normal.         Thought Content: Thought content normal.         Judgment: Judgment normal.       On this date 11/17/2022 I have spent 25 minutes reviewing previous notes, lab/imaging results and face to face with the patient discussing the diagnoses and importance of compliance with the treatment plan, as well as documenting on the day of the visit.         Wade Henderson DO  [facility] [place]

## 2022-12-09 ENCOUNTER — TELEPHONE (OUTPATIENT)
Dept: FAMILY MEDICINE CLINIC | Facility: CLINIC | Age: 62
End: 2022-12-09

## 2022-12-09 DIAGNOSIS — M72.2 PLANTAR FASCIITIS, BILATERAL: ICD-10-CM

## 2022-12-09 DIAGNOSIS — M50.90 CERVICAL DISC DISORDER: ICD-10-CM

## 2022-12-09 DIAGNOSIS — F51.01 PRIMARY INSOMNIA: ICD-10-CM

## 2022-12-09 RX ORDER — TRAMADOL HYDROCHLORIDE 50 MG/1
50 TABLET ORAL EVERY 8 HOURS PRN
Qty: 90 TABLET | Refills: 5 | OUTPATIENT
Start: 2022-12-09 | End: 2023-06-07

## 2022-12-09 NOTE — TELEPHONE ENCOUNTER
Patient left message requesting Tramadol refill  This is a controlled medication and must be seen for refill  Please contact patient and schedule next available appointment, can be telemedicine appointment with Dr Summer Milian if she would like, no refill till appointment

## 2022-12-09 NOTE — TELEPHONE ENCOUNTER
Please read last office note. Per Dr. Sarita Lemus, he said he would refill this and date it for the correct date. She sent him a Intiot message stating how many she had left. Last OV was 11/17/22.

## 2022-12-12 DIAGNOSIS — M50.90 CERVICAL DISC DISORDER: ICD-10-CM

## 2022-12-12 DIAGNOSIS — F51.01 PRIMARY INSOMNIA: ICD-10-CM

## 2022-12-12 DIAGNOSIS — M72.2 PLANTAR FASCIITIS, BILATERAL: ICD-10-CM

## 2022-12-12 PROBLEM — Z00.00 WELL ADULT EXAM: Chronic | Status: ACTIVE | Noted: 2021-05-01

## 2022-12-12 PROBLEM — Z79.899 HIGH RISK MEDICATION USE: Chronic | Status: ACTIVE | Noted: 2020-02-27

## 2022-12-12 PROBLEM — M54.2 CHRONIC NECK PAIN WITH NORMAL NEUROLOGICAL EXAMINATION: Chronic | Status: ACTIVE | Noted: 2017-09-10

## 2022-12-12 PROBLEM — Z79.899 CONTROLLED SUBSTANCE AGREEMENT SIGNED: Chronic | Status: ACTIVE | Noted: 2019-03-03

## 2022-12-12 PROBLEM — R52 PAIN MANAGEMENT: Chronic | Status: ACTIVE | Noted: 2020-02-26

## 2022-12-12 PROBLEM — Z13.1 SCREENING FOR DIABETES MELLITUS: Chronic | Status: ACTIVE | Noted: 2021-05-01

## 2022-12-12 PROBLEM — G89.29 CHRONIC NECK PAIN WITH NORMAL NEUROLOGICAL EXAMINATION: Chronic | Status: ACTIVE | Noted: 2017-09-10

## 2022-12-12 RX ORDER — TRAMADOL HYDROCHLORIDE 50 MG/1
50 TABLET ORAL EVERY 8 HOURS PRN
Qty: 90 TABLET | Refills: 5 | Status: CANCELLED | OUTPATIENT
Start: 2022-12-12 | End: 2023-06-10

## 2023-01-11 PROBLEM — Z00.00 WELL ADULT EXAM: Chronic | Status: RESOLVED | Noted: 2021-05-01 | Resolved: 2023-01-11

## 2023-01-11 PROBLEM — Z13.1 SCREENING FOR DIABETES MELLITUS: Chronic | Status: RESOLVED | Noted: 2021-05-01 | Resolved: 2023-01-11

## 2023-01-25 ENCOUNTER — TELEPHONE (OUTPATIENT)
Dept: ORTHOPEDIC SURGERY | Age: 63
End: 2023-01-25

## 2023-01-25 NOTE — TELEPHONE ENCOUNTER
Called and advised pt she is unable to have a medrol dose pack until Dr. Parker Atwood has seen her in office.

## 2023-01-25 NOTE — TELEPHONE ENCOUNTER
She is having very bad pain in her right wrist. She wants to know if she could get a steroid pack called in. Her appt is Friday but she says she can't wait that long. Please give her a call to discuss what she can do before Friday.

## 2023-01-27 ENCOUNTER — OFFICE VISIT (OUTPATIENT)
Dept: ORTHOPEDIC SURGERY | Age: 63
End: 2023-01-27

## 2023-01-27 DIAGNOSIS — M19.90 INFLAMMATORY ARTHROPATHY: Primary | ICD-10-CM

## 2023-01-27 DIAGNOSIS — M79.641 RIGHT HAND PAIN: ICD-10-CM

## 2023-01-27 DIAGNOSIS — M65.9 SYNOVITIS AND TENOSYNOVITIS: ICD-10-CM

## 2023-01-27 DIAGNOSIS — M19.90 INFLAMMATORY ARTHROPATHY: ICD-10-CM

## 2023-01-27 LAB
BASOPHILS # BLD: 0.1 K/UL (ref 0–0.2)
BASOPHILS NFR BLD: 1 % (ref 0–2)
DIFFERENTIAL METHOD BLD: ABNORMAL
EOSINOPHIL # BLD: 0.1 K/UL (ref 0–0.8)
EOSINOPHIL NFR BLD: 1 % (ref 0.5–7.8)
ERYTHROCYTE [DISTWIDTH] IN BLOOD BY AUTOMATED COUNT: 11.7 % (ref 11.9–14.6)
ERYTHROCYTE [SEDIMENTATION RATE] IN BLOOD: 25 MM/HR (ref 0–30)
HCT VFR BLD AUTO: 39.6 % (ref 35.8–46.3)
HGB BLD-MCNC: 13.1 G/DL (ref 11.7–15.4)
IMM GRANULOCYTES # BLD AUTO: 0 K/UL (ref 0–0.5)
IMM GRANULOCYTES NFR BLD AUTO: 0 % (ref 0–5)
LYMPHOCYTES # BLD: 1.5 K/UL (ref 0.5–4.6)
LYMPHOCYTES NFR BLD: 24 % (ref 13–44)
MCH RBC QN AUTO: 29.8 PG (ref 26.1–32.9)
MCHC RBC AUTO-ENTMCNC: 33.1 G/DL (ref 31.4–35)
MCV RBC AUTO: 90 FL (ref 82–102)
MONOCYTES # BLD: 0.5 K/UL (ref 0.1–1.3)
MONOCYTES NFR BLD: 9 % (ref 4–12)
NEUTS SEG # BLD: 3.9 K/UL (ref 1.7–8.2)
NEUTS SEG NFR BLD: 65 % (ref 43–78)
NRBC # BLD: 0 K/UL (ref 0–0.2)
PLATELET # BLD AUTO: 242 K/UL (ref 150–450)
PMV BLD AUTO: 10.1 FL (ref 9.4–12.3)
RBC # BLD AUTO: 4.4 M/UL (ref 4.05–5.2)
WBC # BLD AUTO: 6 K/UL (ref 4.3–11.1)

## 2023-01-27 RX ORDER — IBUPROFEN 800 MG/1
800 TABLET ORAL
Qty: 270 TABLET | Refills: 1 | Status: SHIPPED | OUTPATIENT
Start: 2023-01-27

## 2023-01-27 RX ORDER — METHYLPREDNISOLONE 4 MG/1
TABLET ORAL
Qty: 1 KIT | Refills: 0 | Status: SHIPPED | OUTPATIENT
Start: 2023-01-27

## 2023-01-27 NOTE — PROGRESS NOTES
Orthopaedic Hand Clinic Note    Name: Ronni Sharif  Age: 65 y.o. YOB: 1960  Gender: female  MRN: [id number]      Follow up visit:   1. Inflammatory arthropathy    2. Right hand pain    3. Synovitis and tenosynovitis        HPI: Ronni Sharif is a 65 y.o. female who is following up for an unrelated issue, she is an established patient of mine who has had left volar wrist ganglion cyst excision and left trigger thumb release with good results, she had an injury to the left thumb and was seen at an outside facility where she was told she had a distal phalanx fracture, she has also developed a right wrist pain, finger swelling and inability to move the fingers for the past week, this is severe and its interfering with her everyday life, she also has numbness and paresthesias throughout the fingers of the right hand, she is not aware of any injury to the right wrist or hand, she has no history of morning stiffness or inflammatory arthritis but she has had multiple issues with cervical spine disease. ROS/Meds/PSH/PMH/FH/SH: I personally reviewed the patients standard intake form. Pertinents are discussed in the HPI    Physical Examination:  General: Awake and alert. HEENT: Normocephalic, atraumatic  CV/Pulm: Breathing even and unlabored  Skin: No obvious rashes noted. Lymphatic: No obvious evidence of lymphedema or lymphadenopathy    Musculoskeletal Examination:  Examination on the right upper extremity demonstrates cap refill < 5 seconds in all fingers, mild swelling of the wrist and the fingers, she is unable make a full composite fist due to pain and swelling, she has subjective decrease sensation median more than ulnar distribution, she has an exquisitely tender Tinel at the carpal tunnel and bogginess of the flexor tendons, severe tenderness palpation of the TFCC fovea, mild tenderness palpation of the radiocarpal joint.     Imaging / Electrodiagnostic Tests:     right Hand XR: AP, Lateral, Oblique and Thumb CMC joint     Clinical Indication:  1. Inflammatory arthropathy    2. Right hand pain    3. Synovitis and tenosynovitis           Report: AP, lateral, oblique and thumb CMC joint hand XRs demonstrates slightly increased slope of the distal radius with 3 mm ulnar positive variance without cystic changes on the lunate or the triquetrum    Impression: as above     Nelda Horowitz MD     X-ray of the left thumb from an outside source was reviewed and independently interpreted, this demonstrates a minimally displaced fracture of the left thumb distal phalanx    Assessment:   1. Inflammatory arthropathy    2. Right hand pain    3. Synovitis and tenosynovitis        Plan:   We discussed the diagnosis and different treatment options. We discussed observation, therapy, antiinflammatory medications and other pertinent treatment modalities. After discussing in detail the patient elects to proceed with Medrol Dosepak followed by ibuprofen 800 mg 3 times daily for 2 weeks, will obtain rheumatoid labs including CBC, CMP, ESR, CRP, rheumatoid factor, antinuclear antibody, uric acid, HLA-B27 and anti-CCP antibodies, I will reassess the patient in 2 weeks and determine further care. More likely not she has carpal tunnel syndrome but there may be some superimposed synovitis and or an inflammatory disease causing the severe swelling of the fingers. In regards to the left thumb we discussed that swelling and pain should improve over time, the fracture appears well aligned and should heal without problems. Patient voiced accordance and understanding of the information provided and the formulated plan. All questions were answered to the patient's satisfaction during the encounter.     Nelda Horowitz MD  Orthopaedic Surgery  01/27/23  11:45 AM

## 2023-01-28 LAB
ALBUMIN SERPL-MCNC: 4.2 G/DL (ref 3.2–4.6)
ALBUMIN/GLOB SERPL: 1.3 (ref 0.4–1.6)
ALP SERPL-CCNC: 60 U/L (ref 50–136)
ALT SERPL-CCNC: 26 U/L (ref 12–65)
ANION GAP SERPL CALC-SCNC: 9 MMOL/L (ref 2–11)
AST SERPL-CCNC: 16 U/L (ref 15–37)
BILIRUB SERPL-MCNC: 0.5 MG/DL (ref 0.2–1.1)
BUN SERPL-MCNC: 11 MG/DL (ref 8–23)
CALCIUM SERPL-MCNC: 9.4 MG/DL (ref 8.3–10.4)
CHLORIDE SERPL-SCNC: 106 MMOL/L (ref 101–110)
CO2 SERPL-SCNC: 24 MMOL/L (ref 21–32)
CREAT SERPL-MCNC: 0.7 MG/DL (ref 0.6–1)
CRP SERPL-MCNC: <0.3 MG/DL (ref 0–0.9)
GLOBULIN SER CALC-MCNC: 3.3 G/DL (ref 2.8–4.5)
GLUCOSE SERPL-MCNC: 100 MG/DL (ref 65–100)
POTASSIUM SERPL-SCNC: 4.3 MMOL/L (ref 3.5–5.1)
PROT SERPL-MCNC: 7.5 G/DL (ref 6.3–8.2)
SODIUM SERPL-SCNC: 139 MMOL/L (ref 133–143)
URATE SERPL-MCNC: 3.4 MG/DL (ref 2.6–6)

## 2023-01-30 LAB
ANA SER QL: NEGATIVE
CCP IGA+IGG SERPL IA-ACNC: 4 UNITS (ref 0–19)
RHEUMATOID FACT SER QL LA: NEGATIVE

## 2023-02-02 ENCOUNTER — APPOINTMENT (RX ONLY)
Dept: URBAN - METROPOLITAN AREA CLINIC 329 | Facility: CLINIC | Age: 63
Setting detail: DERMATOLOGY
End: 2023-02-02

## 2023-02-02 DIAGNOSIS — D22 MELANOCYTIC NEVI: ICD-10-CM

## 2023-02-02 DIAGNOSIS — L82.1 OTHER SEBORRHEIC KERATOSIS: ICD-10-CM

## 2023-02-02 DIAGNOSIS — L82.0 INFLAMED SEBORRHEIC KERATOSIS: ICD-10-CM

## 2023-02-02 DIAGNOSIS — L81.4 OTHER MELANIN HYPERPIGMENTATION: ICD-10-CM

## 2023-02-02 DIAGNOSIS — L57.8 OTHER SKIN CHANGES DUE TO CHRONIC EXPOSURE TO NONIONIZING RADIATION: ICD-10-CM

## 2023-02-02 DIAGNOSIS — L57.0 ACTINIC KERATOSIS: ICD-10-CM

## 2023-02-02 DIAGNOSIS — D18.0 HEMANGIOMA: ICD-10-CM

## 2023-02-02 PROBLEM — D22.5 MELANOCYTIC NEVI OF TRUNK: Status: ACTIVE | Noted: 2023-02-02

## 2023-02-02 PROBLEM — D18.01 HEMANGIOMA OF SKIN AND SUBCUTANEOUS TISSUE: Status: ACTIVE | Noted: 2023-02-02

## 2023-02-02 PROCEDURE — ? COUNSELING

## 2023-02-02 PROCEDURE — ? LIQUID NITROGEN

## 2023-02-02 PROCEDURE — 17000 DESTRUCT PREMALG LESION: CPT | Mod: 59

## 2023-02-02 PROCEDURE — 17110 DESTRUCTION B9 LES UP TO 14: CPT

## 2023-02-02 PROCEDURE — 99213 OFFICE O/P EST LOW 20 MIN: CPT | Mod: 25

## 2023-02-02 PROCEDURE — ? DERMATOSCOPIC EVALUATION

## 2023-02-02 PROCEDURE — ? TREATMENT REGIMEN

## 2023-02-02 PROCEDURE — 17003 DESTRUCT PREMALG LES 2-14: CPT | Mod: 59

## 2023-02-02 ASSESSMENT — LOCATION SIMPLE DESCRIPTION DERM
LOCATION SIMPLE: LEFT CHEEK
LOCATION SIMPLE: CHEST
LOCATION SIMPLE: ANTERIOR SCALP
LOCATION SIMPLE: RIGHT UPPER ARM
LOCATION SIMPLE: LEFT UPPER BACK
LOCATION SIMPLE: ABDOMEN
LOCATION SIMPLE: UPPER BACK
LOCATION SIMPLE: LEFT UPPER ARM

## 2023-02-02 ASSESSMENT — LOCATION DETAILED DESCRIPTION DERM
LOCATION DETAILED: RIGHT ANTERIOR DISTAL UPPER ARM
LOCATION DETAILED: LEFT CENTRAL MALAR CHEEK
LOCATION DETAILED: UPPER STERNUM
LOCATION DETAILED: LEFT INFERIOR UPPER BACK
LOCATION DETAILED: SUPERIOR THORACIC SPINE
LOCATION DETAILED: LEFT ANTERIOR DISTAL UPPER ARM
LOCATION DETAILED: LEFT LATERAL MALAR CHEEK
LOCATION DETAILED: RIGHT LATERAL SUPERIOR CHEST
LOCATION DETAILED: EPIGASTRIC SKIN
LOCATION DETAILED: LEFT LATERAL SUPERIOR CHEST
LOCATION DETAILED: LEFT MEDIAL SUPERIOR CHEST
LOCATION DETAILED: RIGHT MEDIAL SUPERIOR CHEST
LOCATION DETAILED: MID-FRONTAL SCALP

## 2023-02-02 ASSESSMENT — LOCATION ZONE DERM
LOCATION ZONE: FACE
LOCATION ZONE: SCALP
LOCATION ZONE: TRUNK
LOCATION ZONE: ARM

## 2023-02-02 NOTE — PROCEDURE: LIQUID NITROGEN
Render Note In Bullet Format When Appropriate: No
Duration Of Freeze Thaw-Cycle (Seconds): 0
Post-Care Instructions: I reviewed with the patient in detail post-care instructions. Patient is to wear sunprotection, and avoid picking at any of the treated lesions. Pt may apply Vaseline to crusted or scabbing areas.
Show Applicator Variable?: Yes
Consent: The patient's consent was obtained including but not limited to risks of crusting, scabbing, blistering, scarring, darker or lighter pigmentary change, recurrence, incomplete removal and infection.
Detail Level: Detailed
Spray Paint Text: The liquid nitrogen was applied to the skin utilizing a spray paint frosting technique.
Medical Necessity Clause: This procedure was medically necessary because the lesions that were treated were: bleeding and enlarging
Medical Necessity Information: It is in your best interest to select a reason for this procedure from the list below. All of these items fulfill various CMS LCD requirements except the new and changing color options.

## 2023-02-02 NOTE — HPI: SKIN LESION
How Severe Is Your Skin Lesion?: mild
Is This A New Presentation, Or A Follow-Up?: Skin Lesions
Additional History: Patient states that she has lesions scattered through out her body that she would like to have evaluated to make sure that they are non cancerous. Patient denies any changes and has no other complaints or concerns

## 2023-02-03 LAB — HLA-B27 QL NAA+PROBE: NEGATIVE

## 2023-02-10 ENCOUNTER — OFFICE VISIT (OUTPATIENT)
Dept: ORTHOPEDIC SURGERY | Age: 63
End: 2023-02-10

## 2023-02-10 DIAGNOSIS — M24.131 DEGENERATIVE TEAR OF TRIANGULAR FIBROCARTILAGE COMPLEX (TFCC) OF RIGHT WRIST: ICD-10-CM

## 2023-02-10 DIAGNOSIS — M79.641 RIGHT HAND PAIN: Primary | ICD-10-CM

## 2023-02-10 RX ORDER — METHYLPREDNISOLONE ACETATE 40 MG/ML
40 INJECTION, SUSPENSION INTRA-ARTICULAR; INTRALESIONAL; INTRAMUSCULAR; SOFT TISSUE ONCE
Status: COMPLETED | OUTPATIENT
Start: 2023-02-10 | End: 2023-02-10

## 2023-02-10 RX ORDER — MELOXICAM 15 MG/1
15 TABLET ORAL DAILY
Qty: 30 TABLET | Refills: 1 | Status: SHIPPED | OUTPATIENT
Start: 2023-02-10 | End: 2023-03-12

## 2023-02-10 RX ADMIN — METHYLPREDNISOLONE ACETATE 40 MG: 40 INJECTION, SUSPENSION INTRA-ARTICULAR; INTRALESIONAL; INTRAMUSCULAR; SOFT TISSUE at 10:36

## 2023-02-10 NOTE — LETTER
DME Patient Authorization Form    Name: Abby Pillai  : 1960  MRN: [id number]   Age: 65 y.o. Gender: female  Delivery Address: [facility]     Diagnosis: No diagnosis found. Requested DME:  Wrist QUMSLU**- ($35.00) X 1 - right        Clinical Notes:     **Indicates non-covered items by insurance. Payment expected on date of service. Electronically signed by  Provider: Delores Gomez MD__Date: 2/10/2023                            [address] Tax ID # [id number]        Durable Medical Equipment and/or Orthotics Patient Consent     I understand that my physician has prescribed this medical supply as part of my treatment plan as a matter of Medical Necessity.  I understand that I have a choice in where I receive my prescribed orthopedic supplies and/or services.  I authorize [facility] to furnish this service/product and to provide my insurance carrier with any information requested in order to process for payment.  I instruct my insurance carrier to pay [facility] directly for these services/products.  I understand that my insurance carrier may deny payment for this supply because it is a non-covered item, deemed not medically necessary or considered experimental.   I understand that any cost not covered by my insurance carrier will be solely my financial responsibility.  I have received the Supplier Standards and have reviewed them.  I have received the prescribed item and have been fully instructed on the proper use of the above services/products.    ______ (Patient Initials) I understand that all DME items are non-returnable after being dispensed. Items still in sealed packaging may be returned up to 14 days after purchasing.  [address] will replace items that are defective.    ______ (Patient Initials) I understand that Patricia Page will not file a claim with my insurance carrier for this service/product and I am waiving my right to file a claim on my own for this service/product with my insurance company as this item is NON-COVERED (Denoted by the **) by my Insurance company/policy. ______ (Patient Initials) I understand that I am responsible to bring my equipment to the hospital for any surgery. ______________________________________________  ________________________  Patient / Maribel Piedra            Thank you for considering [address] Your physician has prescribed specific medical equipment or devices for your home use. The following describes your rights and responsibilities as our customer. Right to Choose Providers: You have a choice regarding which company supplies your home medical equipment and devices, and to consult your physician in this decision. You may choose a medical supply store, a home medical equipment provider, or a specialist such as [facility]. [facility] will coordinate with your physician to provide the medical equipment or devices prescribed for your home use. Right to Service:  You have the right to considerate, respectful and nondiscriminatory care. You have the right to receive accurate and easily understood information about your health care. If you speak a foreign language, or don't understand the discussions, assistance will be provided to allow you to make informed health care decisions. You have the right to know your treatment options and to participate in decisions about your care, including the right to accept or refuse treatment. You have the right to expect a reasonable response to your requests for treatment or service. You have the right to talk in confidence with health care providers and to have your health care information protected. You have the right to receive an explanation of your bill. You have the right to complain about the service or product you receive. Patient Responsibilities:  Please provide complete and accurate information about your health insurance benefits and make arrangements for the timely payment of your bill. [facility] will, if possible, assume responsibility for billing your insurance (Medicare, Medicaid and commercial) for the prescribed equipment or devices. If your policy does not cover the prescribed product, or only covers a portion of the bill, you are responsible for any remaining balance. Return and Exchange Policy:  [facility] will honor published  Warranties for products. [facility] will accept returns or exchanges within 14 days from the date of receipt, providin) the product must be in new condition; 2) receipt as required; and 3) used disposable and hygiene products may only be returned due to a defective product. Note: Refunds will be issued in a timely manner, please allow 4-6 weeks for processing. Complaint Procedures and DME Consumer Protection Resources:  [facility] values you as a customer, and is committed to resolving patient concerns. This commitment includes understanding and documenting your concerns, conducting a review of internal procedures, and providing you with an explanation and resolution to your concerns. Should you have any questions about our services or billing process, please contact our office at (practice phone number). If we are unable to resolve the concern, you have the right to direct comments to the office of Consumer Protection, in the [address] or the [facility] office, without fear of repercussion. DMEPOS SUPPLIER STANDARDS    A supplier must be in compliance with all applicable Federal and [employer] and regulatory requirements.   A supplier must provide complete and accurate information on the DMEPOS supplier application. Any changes to this information must be reported to the [facility] within 30 days. An authorized individual (one whose signature is binding) must sign the application for billing privileges. A supplier must fill orders from its own inventory, or must contract with other companies for the purchase of items necessary to fill the order. A supplier may not contract with any entity that is currently excluded from the Medicare program, any [facility] program, or from any other Federal procurement or Nonprocurement programs. A supplier must advise beneficiaries that they may rent or purchase inexpensive or routinely purchased durable medical equipment, and of the purchase option for capped rental equipment. A supplier must notify beneficiaries of warranty coverage and honor all warranties under applicable State Law, and repair or replace free of charge Medicare covered items that are under warranty. A supplier must maintain a physical facility on an appropriate site. A supplier must permit CMS, or its agents to conduct on-site inspections to ascertain the supplier's compliance with these standards. The supplier location must be accessible to beneficiaries during reasonable business hours, and must maintain a visible sign and posted hours of operation. A supplier must maintain a primary business telephone listed under the name of the business in a [employer] or a toll free number available through directory assistance. The exclusive use of a beeper, answering machine or cell phone is prohibited. A supplier must have comprehensive liability insurance in the amount of at least $300,000 that covers both the supplier's place of business and all customers and employees of the supplier. If the supplier manufactures its own items, this insurance must also cover product liability and completed operations.   A supplier must agree not to initiate telephone contact with beneficiaries, with a few exceptions allowed. This standard prohibits suppliers from calling beneficiaries in order to solicit new business. A supplier is responsible for delivery and must instruct beneficiaries on use of Medicare covered items, and maintain proof of delivery. A supplier must answer questions, and respond to complaints of the beneficiaries, and maintain documentation of such contacts. A supplier must maintain and replace at no charge or repair directly, or through a service contract with another company, Medicare covered items it has rented to beneficiaries. A supplier must accept returns of substandard (less than full quality for the particular item) or unsuitable items (inappropriate for the beneficiary at the time it was fitted and rented or sold) from beneficiaries. A supplier must disclose these supplier standards to each beneficiary to whom it supplies a Medicare-covered item. A supplier must disclose to the government any person having ownership, financial, or control interest in the supplier. A supplier must not convey or reassign a supplier number; i.e., the supplier may not sell or allow another entity to use its Medicare billing number. A supplier must have a complaint resolution protocol established to address beneficiary complaints that relate to these standards. A record of these complaints must be maintained at the physical facility. Complaint records must include: the name, address, telephone number and health insurance claim number of the beneficiary, a summary of the complaint, and any action taken to resolve it. A supplier must agree to furnish CMS any information required by the Medicare statute and implementing regulations. A supplier of DMEPOS and other items and services must be accredited by a CMS-approved accreditation organization in order to receive and retain a supplier billing number.  The accreditation must indicate the specific products and services, for which the supplier is accredited in order for the supplier to receive payment for those specific products and services. A DMEPOS supplier must notify their accreditation organization when a new DMEPOS location is opened. The accreditation organization may accredit the new supplier location for three months after it is operational without requiring a new site visit. All DMEPOS supplier locations, whether owned or subcontracted, must meet the [employer] and be separately accredited in order to bill Medicare. An accredited supplier may be denied enrollment or their enrollment may be revoked, if CMS determines that they are not in compliance with the DMEPOS quality standards. A DMEPOS supplier must disclose upon enrollment all products and services, including the addition of new product lines for which they are seeking accreditation. If a new product line is added after enrollment, the DMEPOS supplier will be responsible for notifying the accrediting body of the new product so that the DMEPOS supplier can be re-surveyed and accredited for these new products. Must meet the surety bond requirements specified in 42 C. F.R. 424.57(c). Implementation date- May 4, 2009. A supplier must obtain oxygen from a state-licensed oxygen supplier. A supplier must maintain ordering and referring documentation consistent with provisions found in 42 C. F.R. 424.516(f). DMEPOS suppliers are prohibited from sharing a practice location with certain other Medicare providers and suppliers. DMEPOS suppliers must remain open to the public for a minimum of 30 hours per week with certain exceptions.

## 2023-02-10 NOTE — PROGRESS NOTES
Orthopaedic Hand Clinic Note    Name: Seng Kessler  Age: 65 y.o. YOB: 1960  Gender: female  MRN: [id number]      Follow up visit:   1. Right hand pain    2. 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 female who is following up for right hand and wrist pain, on the last visit I prescribed a Medrol Dosepak and ibuprofen, her finger swelling has improved, she is able make a fist, her numbness and paresthesias have resolved, she still has significant pain on the ulnar side. ROS/Meds/PSH/PMH/FH/SH: I personally reviewed the patients standard intake form. Pertinents are discussed in the HPI    Physical Examination:  General: Awake and alert. HEENT: Normocephalic, atraumatic  CV/Pulm: Breathing even and unlabored  Skin: No obvious rashes noted. Lymphatic: No obvious evidence of lymphedema or lymphadenopathy    Musculoskeletal Examination:  Examination on the right upper extremity demonstrates cap refill < 5 seconds in all fingers, full finger motion, severe tenderness palpation of the ulnar fovea as well as the Pisa triquetral joint, normal sensation in all fingers today, no tenderness ovation of the DRUJ or the radiocarpal joint, stable DRUJ. Imaging / Electrodiagnostic Tests:     none    Assessfovea as well as the Pisa triquetral jointt:   1. Right hand pain    2. 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 We discussed observation, therapy, antiinflammatory medications and other pertinent treatment modalities.     After discussing in detail the patient elects to proceed with right TFCC steroid injection, wrist widget for daytime use, Mobic 15 daily for 2 months, I will reassess in 2 months, I discussed that all her labs which we reviewed today are negative, this includes CBC, CMP, ESR, CRP, rheumatoid factor, antinuclear antibody, HLA-B27, anti-CCP antibodies and uric acid, less likely that she has an inflammatory disease, this could have been a flareup of carpal tunnel and osteoarthritis, we will keep the diagnosis of inflammatory arthropathy in the back of our minds for now given that she had a rather abnormal presentation of flare of her just osteoarthritis. Most of her pain today is at the TFCC fovea as well as the Pisa triquetral joint, hopefully the TFCC injection gives her good relief, if she still has residual pain we may try a Pisa triquetral injection. Procedure Note    The risk, benefits and alternatives of injection and no injection therapy were discussed. The patient consented for an injection. The patient has been identified by name and birthdate. The injection site was identified, marked and prepped with a alcohol swab. Time out completed. The Right TFCC  was injected with 1ml of 6mg/ml Betamethasone and 1ml xylocaine plain 1%. The injection site was then dressed with a bandaid. The patient tolerated the injection well. The patient was instructed to monitor their blood sugars if diabetic and call if any concerns. The patient was instructed to call the office if any adverse local effects occurred or any if any questions or concerns arise. Patient voiced accordance and understanding of the information provided and the formulated plan. All questions were answered to the patient's satisfaction during the encounter.     Jace Asher MD  Orthopaedic Surgery  02/10/23  10:54 AM

## 2023-02-10 NOTE — PROGRESS NOTES
The patient was prescribed and fitted with a Wrist Widget for the right wrist.     Patient read and signed documenting they understand and agree to [facility]'s current DME return policy.

## 2023-04-07 DIAGNOSIS — M79.641 RIGHT HAND PAIN: ICD-10-CM

## 2023-04-07 RX ORDER — MELOXICAM 15 MG/1
TABLET ORAL
Qty: 30 TABLET | Refills: 1 | OUTPATIENT
Start: 2023-04-07

## 2023-05-16 ENCOUNTER — OFFICE VISIT (OUTPATIENT)
Dept: ORTHOPEDIC SURGERY | Age: 63
End: 2023-05-16

## 2023-05-16 VITALS — BODY MASS INDEX: 23.9 KG/M2 | HEIGHT: 64 IN | WEIGHT: 140 LBS

## 2023-05-16 DIAGNOSIS — M79.671 PAIN IN RIGHT FOOT: Primary | ICD-10-CM

## 2023-05-16 DIAGNOSIS — M79.672 PAIN OF BOTH HEELS: ICD-10-CM

## 2023-05-16 DIAGNOSIS — M79.671 PAIN OF BOTH HEELS: ICD-10-CM

## 2023-05-16 DIAGNOSIS — M79.672 PAIN IN LEFT FOOT: ICD-10-CM

## 2023-05-16 DIAGNOSIS — M72.2 PLANTAR FASCIITIS: ICD-10-CM

## 2023-05-16 RX ORDER — METHYLPREDNISOLONE ACETATE 40 MG/ML
40 INJECTION, SUSPENSION INTRA-ARTICULAR; INTRALESIONAL; INTRAMUSCULAR; SOFT TISSUE ONCE
Status: COMPLETED | OUTPATIENT
Start: 2023-05-16 | End: 2023-05-16

## 2023-05-16 RX ORDER — PREDNISONE 5 MG/1
TABLET ORAL
Qty: 1 EACH | Refills: 2 | Status: SHIPPED | OUTPATIENT
Start: 2023-05-16

## 2023-05-16 RX ADMIN — METHYLPREDNISOLONE ACETATE 40 MG: 40 INJECTION, SUSPENSION INTRA-ARTICULAR; INTRALESIONAL; INTRAMUSCULAR; SOFT TISSUE at 10:51

## 2023-05-16 RX ADMIN — METHYLPREDNISOLONE ACETATE 40 MG: 40 INJECTION, SUSPENSION INTRA-ARTICULAR; INTRALESIONAL; INTRAMUSCULAR; SOFT TISSUE at 10:52

## 2023-05-16 NOTE — PROGRESS NOTES
Name: China Jay  YOB: 1960  Gender: female  MRN: [id number]     CC: Heel pain    HPI:   April 2023: Onset bilateral heel pain: No trauma  05/16/2023: Initial visit: Bilateral heel pain    ROS/Meds/PSH/PMH/FH/SH: reviewed today    Tobacco:  reports that she has never smoked. She has never used smokeless tobacco.   Epic listed diagnoses: Chronic pain management    Physical Examination:  Patient appears to be alert and oriented with acceptable appearance. No obvious distress or SOB  CV: appears to have acceptable vascular color and capillary refill  Neuro: appears to have mostly intact light touch sensation   Skin: No soft tissue swelling  MS: Standing: Plantigrade: Gait protected first steps  Right: Left = plantar medial heel/plantar fascial pain; no Achilles pain; no posterior or lateral heel tuberosity pain    XR: Right: Left standing AP lateral mortise ankle plus AP oblique foot taken today with os navicularis more noticeable left; no plantar heel enthesopathy; os peroneum  XR Impression:  As above      Reviewed Test/Records/Documents:   03/16/2022: Initial visit: Bilateral heel pain: Air heels: Reparel sleeves: Injections: PT with Michelle Core   04/14/2022Gerarda Popper with PT [address] [facility] was able to clear majority of her symptoms. Injection: We discussed risk complication decided proceed. After sterile prep: EACH right, left heels injected 1 cc Xylocaine, 40 mg Depo-Medrol; she did well    Assessment:    Bilateral plantar medial heel pain consistent with plantar fasciitis    Plan:   The patient and I discussed the above assessment. We explored treatment options.      She was treated in 2022 for bilateral heel pain that seemed to recover and resolve  April 2023, with no incident, heel pain recurred  At this time hopefully she will recover with planned treatment, but if no resolution, consider MRI scan and possible surgery    Advanced medical imaging: No indication today for MRI

## 2023-06-02 ENCOUNTER — NURSE ONLY (OUTPATIENT)
Dept: FAMILY MEDICINE CLINIC | Facility: CLINIC | Age: 63
End: 2023-06-02

## 2023-06-02 DIAGNOSIS — Z13.1 SCREENING FOR DIABETES MELLITUS: ICD-10-CM

## 2023-06-02 DIAGNOSIS — Z00.00 WELL ADULT EXAM: ICD-10-CM

## 2023-06-02 DIAGNOSIS — E78.00 HYPERCHOLESTEROLEMIA: ICD-10-CM

## 2023-06-02 DIAGNOSIS — Z79.899 HIGH RISK MEDICATION USE: ICD-10-CM

## 2023-06-02 DIAGNOSIS — E78.5 DYSLIPIDEMIA: ICD-10-CM

## 2023-06-02 LAB
ALBUMIN SERPL-MCNC: 3.9 G/DL (ref 3.2–4.6)
ALBUMIN/GLOB SERPL: 1.3 (ref 0.4–1.6)
ALP SERPL-CCNC: 46 U/L (ref 50–136)
ALT SERPL-CCNC: 24 U/L (ref 12–65)
ANION GAP SERPL CALC-SCNC: 5 MMOL/L (ref 2–11)
AST SERPL-CCNC: 18 U/L (ref 15–37)
BILIRUB SERPL-MCNC: 0.8 MG/DL (ref 0.2–1.1)
BUN SERPL-MCNC: 12 MG/DL (ref 8–23)
CALCIUM SERPL-MCNC: 9 MG/DL (ref 8.3–10.4)
CHLORIDE SERPL-SCNC: 103 MMOL/L (ref 101–110)
CHOLEST SERPL-MCNC: 243 MG/DL
CO2 SERPL-SCNC: 29 MMOL/L (ref 21–32)
CREAT SERPL-MCNC: 0.8 MG/DL (ref 0.6–1)
GLOBULIN SER CALC-MCNC: 3 G/DL (ref 2.8–4.5)
GLUCOSE SERPL-MCNC: 90 MG/DL (ref 65–100)
HDLC SERPL-MCNC: 73 MG/DL (ref 40–60)
HDLC SERPL: 3.3
LDLC SERPL CALC-MCNC: 161 MG/DL
POTASSIUM SERPL-SCNC: 4 MMOL/L (ref 3.5–5.1)
PROT SERPL-MCNC: 6.9 G/DL (ref 6.3–8.2)
SODIUM SERPL-SCNC: 137 MMOL/L (ref 133–143)
TRIGL SERPL-MCNC: 45 MG/DL (ref 35–150)
VLDLC SERPL CALC-MCNC: 9 MG/DL (ref 6–23)

## 2023-06-06 RX ORDER — ZOLPIDEM TARTRATE 5 MG/1
5 TABLET ORAL NIGHTLY PRN
Qty: 90 TABLET | Refills: 1 | Status: CANCELLED | OUTPATIENT
Start: 2023-06-06 | End: 2023-12-03

## 2023-06-07 ENCOUNTER — OFFICE VISIT (OUTPATIENT)
Dept: FAMILY MEDICINE CLINIC | Facility: CLINIC | Age: 63
End: 2023-06-07
Payer: COMMERCIAL

## 2023-06-07 VITALS
BODY MASS INDEX: 23.05 KG/M2 | SYSTOLIC BLOOD PRESSURE: 110 MMHG | HEIGHT: 64 IN | DIASTOLIC BLOOD PRESSURE: 61 MMHG | WEIGHT: 135 LBS

## 2023-06-07 DIAGNOSIS — Z79.899 CONTROLLED SUBSTANCE AGREEMENT SIGNED: Chronic | ICD-10-CM

## 2023-06-07 DIAGNOSIS — M54.2 CHRONIC NECK PAIN WITH NORMAL NEUROLOGICAL EXAMINATION: ICD-10-CM

## 2023-06-07 DIAGNOSIS — Z00.00 WELL ADULT EXAM: Primary | ICD-10-CM

## 2023-06-07 DIAGNOSIS — R52 PAIN MANAGEMENT: ICD-10-CM

## 2023-06-07 DIAGNOSIS — Z02.83 ENCOUNTER FOR DRUG SCREENING: ICD-10-CM

## 2023-06-07 DIAGNOSIS — G89.29 CHRONIC NECK PAIN WITH NORMAL NEUROLOGICAL EXAMINATION: ICD-10-CM

## 2023-06-07 DIAGNOSIS — Z51.81 MEDICATION MONITORING ENCOUNTER: ICD-10-CM

## 2023-06-07 DIAGNOSIS — F11.20 OPIOID DEPENDENCE WITH CURRENT USE (HCC): ICD-10-CM

## 2023-06-07 DIAGNOSIS — F33.0 MILD EPISODE OF RECURRENT MAJOR DEPRESSIVE DISORDER (HCC): ICD-10-CM

## 2023-06-07 DIAGNOSIS — F51.01 PRIMARY INSOMNIA: ICD-10-CM

## 2023-06-07 DIAGNOSIS — E78.5 DYSLIPIDEMIA: Chronic | ICD-10-CM

## 2023-06-07 DIAGNOSIS — M50.90 CERVICAL DISC DISORDER: ICD-10-CM

## 2023-06-07 DIAGNOSIS — Z79.899 HIGH RISK MEDICATION USE: ICD-10-CM

## 2023-06-07 LAB
11-NOR-9-THC-9-COOH, POC: NEGATIVE
AMPHETAMINE, URINE, POC: NEGATIVE
BENZODIAZEPINES, URINE, POC: NEGATIVE
BENZOYLECGONINE, URINE, POC: NEGATIVE
METHADONE, URINE, POC: NEGATIVE
METHAMPHETAMINE, URINE, POC: NEGATIVE
MORPHINE, URINE, POC: NEGATIVE
PHENCYCLIDINE, URINE, POC: NEGATIVE
URINALYSIS COLOR, POC: YELLOW

## 2023-06-07 PROCEDURE — 80305 DRUG TEST PRSMV DIR OPT OBS: CPT | Performed by: FAMILY MEDICINE

## 2023-06-07 PROCEDURE — 99396 PREV VISIT EST AGE 40-64: CPT | Performed by: FAMILY MEDICINE

## 2023-06-07 RX ORDER — ZOLPIDEM TARTRATE 5 MG/1
5 TABLET ORAL NIGHTLY PRN
Qty: 90 TABLET | Refills: 1 | Status: SHIPPED | OUTPATIENT
Start: 2023-06-07 | End: 2023-12-04

## 2023-06-07 RX ORDER — TRAMADOL HYDROCHLORIDE 50 MG/1
50 TABLET ORAL EVERY 8 HOURS PRN
Qty: 90 TABLET | Refills: 5 | Status: SHIPPED | OUTPATIENT
Start: 2023-06-07 | End: 2023-12-04

## 2023-06-07 SDOH — ECONOMIC STABILITY: FOOD INSECURITY: WITHIN THE PAST 12 MONTHS, YOU WORRIED THAT YOUR FOOD WOULD RUN OUT BEFORE YOU GOT MONEY TO BUY MORE.: PATIENT DECLINED

## 2023-06-07 SDOH — ECONOMIC STABILITY: INCOME INSECURITY: HOW HARD IS IT FOR YOU TO PAY FOR THE VERY BASICS LIKE FOOD, HOUSING, MEDICAL CARE, AND HEATING?: PATIENT DECLINED

## 2023-06-07 SDOH — ECONOMIC STABILITY: HOUSING INSECURITY
IN THE LAST 12 MONTHS, WAS THERE A TIME WHEN YOU DID NOT HAVE A STEADY PLACE TO SLEEP OR SLEPT IN A SHELTER (INCLUDING NOW)?: PATIENT REFUSED

## 2023-06-07 SDOH — ECONOMIC STABILITY: FOOD INSECURITY: WITHIN THE PAST 12 MONTHS, THE FOOD YOU BOUGHT JUST DIDN'T LAST AND YOU DIDN'T HAVE MONEY TO GET MORE.: PATIENT DECLINED

## 2023-06-08 ENCOUNTER — TELEPHONE (OUTPATIENT)
Dept: ORTHOPEDIC SURGERY | Age: 63
End: 2023-06-08

## 2023-06-08 NOTE — TELEPHONE ENCOUNTER
They filled the steroids for six days rather than 12 days in error. They have talked to the patient and they are correcting it so she will get the 12 days. They just wanted to notify the doctor.

## 2023-06-22 PROBLEM — F11.20 OPIOID DEPENDENCE WITH CURRENT USE (HCC): Status: ACTIVE | Noted: 2023-06-22

## 2023-06-22 ASSESSMENT — ENCOUNTER SYMPTOMS
SHORTNESS OF BREATH: 0
EYE PAIN: 0
ABDOMINAL PAIN: 0
COUGH: 0
DIARRHEA: 0
CONSTIPATION: 0
SORE THROAT: 0
VOMITING: 0
WHEEZING: 0
BACK PAIN: 0

## 2023-07-24 DIAGNOSIS — M79.641 RIGHT HAND PAIN: ICD-10-CM

## 2023-07-24 RX ORDER — IBUPROFEN 800 MG/1
TABLET ORAL
Qty: 270 TABLET | Refills: 1 | OUTPATIENT
Start: 2023-07-24

## 2023-09-21 ENCOUNTER — OFFICE VISIT (OUTPATIENT)
Dept: FAMILY MEDICINE CLINIC | Facility: CLINIC | Age: 63
End: 2023-09-21
Payer: COMMERCIAL

## 2023-09-21 VITALS
DIASTOLIC BLOOD PRESSURE: 64 MMHG | HEIGHT: 64 IN | BODY MASS INDEX: 23.56 KG/M2 | SYSTOLIC BLOOD PRESSURE: 106 MMHG | WEIGHT: 138 LBS

## 2023-09-21 DIAGNOSIS — Z12.4 SCREENING FOR CERVICAL CANCER: ICD-10-CM

## 2023-09-21 DIAGNOSIS — Z01.419 WELL WOMAN EXAM WITH ROUTINE GYNECOLOGICAL EXAM: Primary | ICD-10-CM

## 2023-09-21 PROBLEM — Z92.29 COVID-19 VACCINE SERIES COMPLETED: Status: RESOLVED | Noted: 2021-10-22 | Resolved: 2023-09-21

## 2023-09-21 PROCEDURE — 99396 PREV VISIT EST AGE 40-64: CPT | Performed by: FAMILY MEDICINE

## 2023-09-21 ASSESSMENT — ENCOUNTER SYMPTOMS
DIARRHEA: 0
BACK PAIN: 0
COUGH: 0
EYE PAIN: 0
WHEEZING: 0
VOMITING: 0
ABDOMINAL PAIN: 0
SORE THROAT: 0
CONSTIPATION: 0
SHORTNESS OF BREATH: 0

## 2023-09-28 LAB
CYTOLOGIST CVX/VAG CYTO: NORMAL
CYTOLOGY CVX/VAG DOC THIN PREP: NORMAL
HPV APTIMA: NEGATIVE
HPV GENOTYPE REFLEX: NORMAL
Lab: NORMAL
PATH REPORT.FINAL DX SPEC: NORMAL
STAT OF ADQ CVX/VAG CYTO-IMP: NORMAL

## 2023-10-01 DIAGNOSIS — F32.0 MAJOR DEPRESSIVE DISORDER, SINGLE EPISODE, MILD (HCC): ICD-10-CM

## 2023-10-02 DIAGNOSIS — F32.0 MAJOR DEPRESSIVE DISORDER, SINGLE EPISODE, MILD (HCC): ICD-10-CM

## 2023-10-02 RX ORDER — ESCITALOPRAM OXALATE 20 MG/1
20 TABLET ORAL DAILY
Qty: 90 TABLET | Refills: 3 | OUTPATIENT
Start: 2023-10-02

## 2023-10-03 RX ORDER — ESCITALOPRAM OXALATE 20 MG/1
20 TABLET ORAL DAILY
Qty: 90 TABLET | Refills: 3 | OUTPATIENT
Start: 2023-10-03

## 2023-10-05 DIAGNOSIS — F32.0 MAJOR DEPRESSIVE DISORDER, SINGLE EPISODE, MILD (HCC): ICD-10-CM

## 2023-10-05 DIAGNOSIS — M79.641 RIGHT HAND PAIN: ICD-10-CM

## 2023-10-06 DIAGNOSIS — F32.0 MAJOR DEPRESSIVE DISORDER, SINGLE EPISODE, MILD (HCC): ICD-10-CM

## 2023-10-06 RX ORDER — ESCITALOPRAM OXALATE 20 MG/1
20 TABLET ORAL DAILY
Qty: 90 TABLET | Refills: 3 | Status: SHIPPED | OUTPATIENT
Start: 2023-10-06

## 2023-10-06 RX ORDER — ESCITALOPRAM OXALATE 20 MG/1
20 TABLET ORAL DAILY
Qty: 90 TABLET | Refills: 3 | OUTPATIENT
Start: 2023-10-06

## 2023-10-06 RX ORDER — IBUPROFEN 800 MG/1
TABLET ORAL
Qty: 270 TABLET | Refills: 1 | OUTPATIENT
Start: 2023-10-06

## 2023-11-30 DIAGNOSIS — M50.90 CERVICAL DISC DISORDER: ICD-10-CM

## 2023-11-30 DIAGNOSIS — Z79.899 CONTROLLED SUBSTANCE AGREEMENT SIGNED: Chronic | ICD-10-CM

## 2023-11-30 DIAGNOSIS — M54.2 CHRONIC NECK PAIN WITH NORMAL NEUROLOGICAL EXAMINATION: ICD-10-CM

## 2023-11-30 DIAGNOSIS — R52 PAIN MANAGEMENT: ICD-10-CM

## 2023-11-30 DIAGNOSIS — F51.01 PRIMARY INSOMNIA: ICD-10-CM

## 2023-11-30 DIAGNOSIS — G89.29 CHRONIC NECK PAIN WITH NORMAL NEUROLOGICAL EXAMINATION: ICD-10-CM

## 2023-11-30 NOTE — TELEPHONE ENCOUNTER
Spoke with patient to reschedule appointment , Dr Nicko Villalobos patient  Wants to research our providers before making decision  Needs refill for Tramadol and Ambien , will be out on Monday 12/4/2023    Please advise about these two refills

## 2023-12-01 ENCOUNTER — OFFICE VISIT (OUTPATIENT)
Dept: ORTHOPEDIC SURGERY | Age: 63
End: 2023-12-01

## 2023-12-01 VITALS — HEIGHT: 64 IN | WEIGHT: 130 LBS | BODY MASS INDEX: 22.2 KG/M2

## 2023-12-01 DIAGNOSIS — M79.671 PAIN OF BOTH HEELS: ICD-10-CM

## 2023-12-01 DIAGNOSIS — M72.2 PLANTAR FASCIITIS: Primary | ICD-10-CM

## 2023-12-01 DIAGNOSIS — M79.672 PAIN OF BOTH HEELS: ICD-10-CM

## 2023-12-01 RX ORDER — METHYLPREDNISOLONE ACETATE 40 MG/ML
40 INJECTION, SUSPENSION INTRA-ARTICULAR; INTRALESIONAL; INTRAMUSCULAR; SOFT TISSUE ONCE
Status: COMPLETED | OUTPATIENT
Start: 2023-12-01 | End: 2023-12-01

## 2023-12-01 RX ORDER — ZOLPIDEM TARTRATE 5 MG/1
5 TABLET ORAL NIGHTLY PRN
Qty: 30 TABLET | Refills: 0 | Status: SHIPPED | OUTPATIENT
Start: 2023-12-01 | End: 2023-12-31

## 2023-12-01 RX ORDER — TRAMADOL HYDROCHLORIDE 50 MG/1
50 TABLET ORAL EVERY 8 HOURS PRN
Qty: 90 TABLET | Refills: 0 | Status: SHIPPED | OUTPATIENT
Start: 2023-12-01 | End: 2023-12-31

## 2023-12-01 RX ADMIN — METHYLPREDNISOLONE ACETATE 40 MG: 40 INJECTION, SUSPENSION INTRA-ARTICULAR; INTRALESIONAL; INTRAMUSCULAR; SOFT TISSUE at 13:53

## 2023-12-01 RX ADMIN — METHYLPREDNISOLONE ACETATE 40 MG: 40 INJECTION, SUSPENSION INTRA-ARTICULAR; INTRALESIONAL; INTRAMUSCULAR; SOFT TISSUE at 13:55

## 2023-12-01 NOTE — PROGRESS NOTES
Name: Alec Mejia  YOB: 1960  Gender: female  MRN: [id number]     CC: Heel pain    HPI:   April 2023: Onset bilateral heel pain: No trauma  05/16/2023: Bilateral heel pain  12/01/2023: Bilateral recurrent heel pain    ROS/Meds/PSH/PMH/FH/SH: reviewed today    Tobacco:  reports that she has never smoked. She has never used smokeless tobacco.   Epic listed diagnoses: Chronic pain management    Physical Examination:  Patient appears to be alert and oriented with acceptable appearance. No obvious distress or SOB  CV: appears to have acceptable vascular color and capillary refill  Neuro: appears to have mostly intact light touch sensation   Skin: No soft tissue swelling  MS: Standing: Plantigrade: Gait protected first steps  Right: Left = plantar medial heel/plantar fascial pain; no fascial deficiency; no Achilles pain; no posterior or lateral heel tuberosity pain    XR: Right: Left standing AP lateral mortise ankle plus AP oblique foot taken 05/16/2023 with os navicular more noticeable left; no plantar heel enthesopathy; os peroneum  XR Impression:  As above      Reviewed Test/Records/Documents:   03/16/2022: Initial visit: Bilateral heel pain: Air heels: Reparel sleeves: Injections: PT with Ahmet Borrego   04/14/2022: Kim Hicks was able to clear majority of her symptoms. Injection: We discussed risks/complications decided proceed. After sterile prep: EACH RIGHT LEFT heels injected 1 cc Xylocaine, 40 mg Depo-Medrol; she did well    Assessment:    Bilateral plantar medial heel pain consistent with plantar fasciitis    Plan:   The patient and I discussed the above assessment. We explored treatment options.      She was treated in 2022 for bilateral heel pain that seemed to recover and resolve  April 2023, with no incident, heel pain recurred  November 2023: Again with no incident, her heel pain recurred  She understands that recurrent heel injections are not a long-term

## 2024-01-17 ENCOUNTER — NURSE TRIAGE (OUTPATIENT)
Dept: OTHER | Facility: CLINIC | Age: 64
End: 2024-01-17
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
## 2024-02-02 ENCOUNTER — APPOINTMENT (RX ONLY)
Dept: URBAN - METROPOLITAN AREA CLINIC 329 | Facility: CLINIC | Age: 64
Setting detail: DERMATOLOGY
End: 2024-02-02

## 2024-02-02 DIAGNOSIS — D22 MELANOCYTIC NEVI: ICD-10-CM

## 2024-02-02 DIAGNOSIS — Z12.83 ENCOUNTER FOR SCREENING FOR MALIGNANT NEOPLASM OF SKIN: ICD-10-CM

## 2024-02-02 DIAGNOSIS — L81.4 OTHER MELANIN HYPERPIGMENTATION: ICD-10-CM

## 2024-02-02 DIAGNOSIS — L57.8 OTHER SKIN CHANGES DUE TO CHRONIC EXPOSURE TO NONIONIZING RADIATION: ICD-10-CM

## 2024-02-02 DIAGNOSIS — L82.1 OTHER SEBORRHEIC KERATOSIS: ICD-10-CM

## 2024-02-02 DIAGNOSIS — Z71.89 OTHER SPECIFIED COUNSELING: ICD-10-CM

## 2024-02-02 DIAGNOSIS — L57.0 ACTINIC KERATOSIS: ICD-10-CM | Status: INADEQUATELY CONTROLLED

## 2024-02-02 DIAGNOSIS — L82.0 INFLAMED SEBORRHEIC KERATOSIS: ICD-10-CM | Status: INADEQUATELY CONTROLLED

## 2024-02-02 DIAGNOSIS — D18.0 HEMANGIOMA: ICD-10-CM

## 2024-02-02 PROBLEM — D22.71 MELANOCYTIC NEVI OF RIGHT LOWER LIMB, INCLUDING HIP: Status: ACTIVE | Noted: 2024-02-02

## 2024-02-02 PROBLEM — D22.5 MELANOCYTIC NEVI OF TRUNK: Status: ACTIVE | Noted: 2024-02-02

## 2024-02-02 PROBLEM — D22.61 MELANOCYTIC NEVI OF RIGHT UPPER LIMB, INCLUDING SHOULDER: Status: ACTIVE | Noted: 2024-02-02

## 2024-02-02 PROBLEM — D18.01 HEMANGIOMA OF SKIN AND SUBCUTANEOUS TISSUE: Status: ACTIVE | Noted: 2024-02-02

## 2024-02-02 PROBLEM — D22.72 MELANOCYTIC NEVI OF LEFT LOWER LIMB, INCLUDING HIP: Status: ACTIVE | Noted: 2024-02-02

## 2024-02-02 PROCEDURE — 17003 DESTRUCT PREMALG LES 2-14: CPT | Mod: 59

## 2024-02-02 PROCEDURE — ? FULL BODY SKIN EXAM

## 2024-02-02 PROCEDURE — ? COUNSELING

## 2024-02-02 PROCEDURE — 17110 DESTRUCTION B9 LES UP TO 14: CPT

## 2024-02-02 PROCEDURE — 17000 DESTRUCT PREMALG LESION: CPT | Mod: 59

## 2024-02-02 PROCEDURE — ? TREATMENT REGIMEN

## 2024-02-02 PROCEDURE — ? LIQUID NITROGEN

## 2024-02-02 PROCEDURE — ? SUNSCREEN RECOMMENDATIONS

## 2024-02-02 PROCEDURE — 99213 OFFICE O/P EST LOW 20 MIN: CPT | Mod: 25

## 2024-02-02 ASSESSMENT — LOCATION SIMPLE DESCRIPTION DERM
LOCATION SIMPLE: RIGHT UPPER BACK
LOCATION SIMPLE: RIGHT THIGH
LOCATION SIMPLE: UPPER BACK
LOCATION SIMPLE: ANTERIOR SCALP
LOCATION SIMPLE: RIGHT UPPER ARM
LOCATION SIMPLE: NOSE
LOCATION SIMPLE: RIGHT CLAVICULAR SKIN
LOCATION SIMPLE: LEFT CHEEK
LOCATION SIMPLE: LEFT UPPER BACK
LOCATION SIMPLE: CHEST
LOCATION SIMPLE: RIGHT FOREARM
LOCATION SIMPLE: RIGHT LIP
LOCATION SIMPLE: LEFT POSTERIOR THIGH
LOCATION SIMPLE: LEFT CALF
LOCATION SIMPLE: ABDOMEN
LOCATION SIMPLE: LEFT UPPER ARM

## 2024-02-02 ASSESSMENT — LOCATION DETAILED DESCRIPTION DERM
LOCATION DETAILED: LEFT INFERIOR UPPER BACK
LOCATION DETAILED: COLUMELLA
LOCATION DETAILED: LEFT DISTAL CALF
LOCATION DETAILED: RIGHT PROXIMAL DORSAL FOREARM
LOCATION DETAILED: RIGHT CLAVICULAR SKIN
LOCATION DETAILED: RIGHT INFERIOR VERMILION LIP
LOCATION DETAILED: UPPER STERNUM
LOCATION DETAILED: SUPERIOR THORACIC SPINE
LOCATION DETAILED: LEFT CENTRAL MALAR CHEEK
LOCATION DETAILED: RIGHT SUPERIOR MEDIAL UPPER BACK
LOCATION DETAILED: EPIGASTRIC SKIN
LOCATION DETAILED: LEFT PROXIMAL POSTERIOR THIGH
LOCATION DETAILED: LEFT DISTAL POSTERIOR THIGH
LOCATION DETAILED: RIGHT ANTERIOR DISTAL UPPER ARM
LOCATION DETAILED: RIGHT VENTRAL DISTAL FOREARM
LOCATION DETAILED: LEFT ANTERIOR DISTAL UPPER ARM
LOCATION DETAILED: RIGHT ANTERIOR DISTAL THIGH
LOCATION DETAILED: MID-FRONTAL SCALP
LOCATION DETAILED: RIGHT ANTERIOR PROXIMAL UPPER ARM

## 2024-02-02 ASSESSMENT — LOCATION ZONE DERM
LOCATION ZONE: LEG
LOCATION ZONE: SCALP
LOCATION ZONE: ARM
LOCATION ZONE: TRUNK
LOCATION ZONE: NOSE
LOCATION ZONE: LIP
LOCATION ZONE: FACE

## 2024-02-02 NOTE — PROCEDURE: LIQUID NITROGEN
Post-Care Instructions: I reviewed with the patient in detail post-care instructions. Patient is to wear sunprotection, and avoid picking at any of the treated lesions. Pt may apply Vaseline to crusted or scabbing areas.
Render Post-Care Instructions In Note?: no
Consent: The patient's consent was obtained including but not limited to risks of crusting, scabbing, blistering, scarring, darker or lighter pigmentary change, recurrence, incomplete removal and infection.
Duration Of Freeze Thaw-Cycle (Seconds): 0
Detail Level: Detailed
Show Aperture Variable?: Yes
Medical Necessity Clause: This procedure was medically necessary because the lesions that were treated were: bleeding and enlarging
Spray Paint Text: The liquid nitrogen was applied to the skin utilizing a spray paint frosting technique.
Medical Necessity Information: It is in your best interest to select a reason for this procedure from the list below. All of these items fulfill various CMS LCD requirements except the new and changing color options.

## 2024-02-02 NOTE — HPI: SKIN LESION
What Type Of Note Output Would You Prefer (Optional)?: Bullet Format
How Severe Is Your Skin Lesion?: mild
Is This A New Presentation, Or A Follow-Up?: Skin Lesions
Additional History: Patient states she has several spots of concern on face, arm and legs.

## 2024-03-16 ENCOUNTER — TRANSCRIBE ORDERS (OUTPATIENT)
Dept: SCHEDULING | Age: 64
End: 2024-03-16

## 2024-03-16 DIAGNOSIS — Z12.31 BREAST CANCER SCREENING BY MAMMOGRAM: Primary | ICD-10-CM

## 2024-03-29 ENCOUNTER — TELEPHONE (OUTPATIENT)
Dept: FAMILY MEDICINE CLINIC | Facility: CLINIC | Age: 64
End: 2024-03-29

## 2024-03-29 NOTE — TELEPHONE ENCOUNTER
----- Message from Rani Brooks sent at 3/29/2024 12:47 PM EDT -----  Subject: Message to Provider    QUESTIONS  Information for Provider? patient called and would like to talk to Gisela,   she changed her appointment and cannot make that date 4/18, i did not   having anything before Aug. please call patient   ---------------------------------------------------------------------------  --------------  CALL BACK INFO  [phone]; OK to leave message on voicemail  ---------------------------------------------------------------------------  --------------  SCRIPT ANSWERS  Relationship to Patient? Self

## 2024-04-19 RX ORDER — ZOLPIDEM TARTRATE 5 MG/1
5 TABLET ORAL NIGHTLY PRN
Qty: 30 TABLET | Refills: 5 | Status: CANCELLED | OUTPATIENT
Start: 2024-04-19 | End: 2024-10-16

## 2024-04-19 SDOH — HEALTH STABILITY: PHYSICAL HEALTH: ON AVERAGE, HOW MANY MINUTES DO YOU ENGAGE IN EXERCISE AT THIS LEVEL?: 60 MIN

## 2024-04-19 SDOH — HEALTH STABILITY: PHYSICAL HEALTH: ON AVERAGE, HOW MANY DAYS PER WEEK DO YOU ENGAGE IN MODERATE TO STRENUOUS EXERCISE (LIKE A BRISK WALK)?: 3 DAYS

## 2024-04-22 ENCOUNTER — OFFICE VISIT (OUTPATIENT)
Dept: FAMILY MEDICINE CLINIC | Facility: CLINIC | Age: 64
End: 2024-04-22
Payer: COMMERCIAL

## 2024-04-22 VITALS
SYSTOLIC BLOOD PRESSURE: 144 MMHG | DIASTOLIC BLOOD PRESSURE: 90 MMHG | HEIGHT: 64 IN | BODY MASS INDEX: 22.91 KG/M2 | WEIGHT: 134.2 LBS | HEART RATE: 52 BPM | OXYGEN SATURATION: 99 %

## 2024-04-22 DIAGNOSIS — J01.90 ACUTE BACTERIAL SINUSITIS: Primary | ICD-10-CM

## 2024-04-22 DIAGNOSIS — M54.2 CHRONIC NECK PAIN WITH NORMAL NEUROLOGICAL EXAMINATION: ICD-10-CM

## 2024-04-22 DIAGNOSIS — M50.90 CERVICAL DISC DISORDER: ICD-10-CM

## 2024-04-22 DIAGNOSIS — B96.89 ACUTE BACTERIAL SINUSITIS: Primary | ICD-10-CM

## 2024-04-22 DIAGNOSIS — R52 PAIN MANAGEMENT: ICD-10-CM

## 2024-04-22 DIAGNOSIS — E78.5 DYSLIPIDEMIA: ICD-10-CM

## 2024-04-22 DIAGNOSIS — F51.01 PRIMARY INSOMNIA: ICD-10-CM

## 2024-04-22 DIAGNOSIS — F32.0 MAJOR DEPRESSIVE DISORDER, SINGLE EPISODE, MILD (HCC): ICD-10-CM

## 2024-04-22 DIAGNOSIS — G89.29 CHRONIC NECK PAIN WITH NORMAL NEUROLOGICAL EXAMINATION: ICD-10-CM

## 2024-04-22 DIAGNOSIS — Z79.899 CONTROLLED SUBSTANCE AGREEMENT SIGNED: Chronic | ICD-10-CM

## 2024-04-22 PROBLEM — F11.20 OPIOID DEPENDENCE WITH CURRENT USE (HCC): Status: RESOLVED | Noted: 2023-06-22 | Resolved: 2024-04-22

## 2024-04-22 PROCEDURE — 99214 OFFICE O/P EST MOD 30 MIN: CPT | Performed by: FAMILY MEDICINE

## 2024-04-22 RX ORDER — AMOXICILLIN AND CLAVULANATE POTASSIUM 875; 125 MG/1; MG/1
1 TABLET, FILM COATED ORAL 2 TIMES DAILY
Qty: 20 TABLET | Refills: 0 | Status: SHIPPED | OUTPATIENT
Start: 2024-04-22 | End: 2024-05-02

## 2024-04-22 RX ORDER — ESCITALOPRAM OXALATE 20 MG/1
20 TABLET ORAL DAILY
Qty: 90 TABLET | Refills: 3 | Status: SHIPPED | OUTPATIENT
Start: 2024-04-22

## 2024-04-22 ASSESSMENT — ENCOUNTER SYMPTOMS: COUGH: 1

## 2024-04-22 ASSESSMENT — PATIENT HEALTH QUESTIONNAIRE - PHQ9
6. FEELING BAD ABOUT YOURSELF - OR THAT YOU ARE A FAILURE OR HAVE LET YOURSELF OR YOUR FAMILY DOWN: NOT AT ALL
2. FEELING DOWN, DEPRESSED OR HOPELESS: NOT AT ALL
1. LITTLE INTEREST OR PLEASURE IN DOING THINGS: NOT AT ALL
8. MOVING OR SPEAKING SO SLOWLY THAT OTHER PEOPLE COULD HAVE NOTICED. OR THE OPPOSITE, BEING SO FIGETY OR RESTLESS THAT YOU HAVE BEEN MOVING AROUND A LOT MORE THAN USUAL: NOT AT ALL
SUM OF ALL RESPONSES TO PHQ QUESTIONS 1-9: 0
3. TROUBLE FALLING OR STAYING ASLEEP: NOT AT ALL
5. POOR APPETITE OR OVEREATING: NOT AT ALL
SUM OF ALL RESPONSES TO PHQ QUESTIONS 1-9: 0
SUM OF ALL RESPONSES TO PHQ9 QUESTIONS 1 & 2: 0
9. THOUGHTS THAT YOU WOULD BE BETTER OFF DEAD, OR OF HURTING YOURSELF: NOT AT ALL
4. FEELING TIRED OR HAVING LITTLE ENERGY: NOT AT ALL
SUM OF ALL RESPONSES TO PHQ QUESTIONS 1-9: 0
10. IF YOU CHECKED OFF ANY PROBLEMS, HOW DIFFICULT HAVE THESE PROBLEMS MADE IT FOR YOU TO DO YOUR WORK, TAKE CARE OF THINGS AT HOME, OR GET ALONG WITH OTHER PEOPLE: NOT DIFFICULT AT ALL
SUM OF ALL RESPONSES TO PHQ QUESTIONS 1-9: 0
7. TROUBLE CONCENTRATING ON THINGS, SUCH AS READING THE NEWSPAPER OR WATCHING TELEVISION: NOT AT ALL

## 2024-04-22 NOTE — PROGRESS NOTES
PROGRESS NOTE    SUBJECTIVE:   Pooja Gamez is a 63 y.o. female seen for a follow up visit regarding   Chief Complaint   Patient presents with    Establish Care     Here to establish care with provider; former patient of Dr. Caballero     Sinusitis     Complains of teeth pain, right ear clogged, sinus pressure since yesterday        HPI:  Patient reports URI symptoms, drainage, sinus pressure, teeth aching.  No fever reported.  Occasional cough.  She is taking some over-the-counter expectorant.         Reviewed and updated this visit by provider:           Review of Systems   Constitutional:  Negative for fever.   HENT:  Positive for ear pain and postnasal drip. Negative for congestion.    Respiratory:  Positive for cough.           OBJECTIVE:  Vitals:    04/22/24 1105   BP: (!) 144/90   Site: Left Upper Arm   Cuff Size: Small Adult   Pulse: 52   SpO2: 99%   Weight: 60.9 kg (134 lb 3.2 oz)   Height: 1.626 m (5' 4.02\")        Physical Exam   General: Alert and oriented x3, mildly ill-appearing, nontoxic  HEENT: Normocephalic; normal ears and ear canals are normal, tympanic membranes are dull bilaterally; PERRLA, EOMI, normal conjunctiva; injected nasal mucosa purulent drainage; oropharynx is moist posterior drainage and erythema  Neck: No adenopathy, thyromegaly or thyroid nodules  Pulmonary: Normal effort, good airflow, no rales or rhonchi  CVS: Regular rate and rhythm, normal S1, S2, no S3 or S4, no murmurs; no carotid bruits, 2+ pedal pulses  Abdomen: Nondistended, normal bowel sounds, nontender without mass organomegaly  Extremities: No cyanosis/clubbing/edema  Medical problems and test results were reviewed with the patient today.     No results found for this or any previous visit (from the past 672 hour(s)).    No results found for any visits on 04/22/24.     ASSESSMENT and PLAN    1. Acute bacterial sinusitis  The following orders have not been finalized:  -     amoxicillin-clavulanate (AUGMENTIN)

## 2024-05-28 ENCOUNTER — NURSE ONLY (OUTPATIENT)
Dept: FAMILY MEDICINE CLINIC | Facility: CLINIC | Age: 64
End: 2024-05-28

## 2024-05-28 DIAGNOSIS — E78.5 DYSLIPIDEMIA: Primary | Chronic | ICD-10-CM

## 2024-05-28 DIAGNOSIS — E78.5 DYSLIPIDEMIA: Chronic | ICD-10-CM

## 2024-05-28 LAB
ALBUMIN SERPL-MCNC: 4 G/DL (ref 3.2–4.6)
ALBUMIN/GLOB SERPL: 1.3 (ref 1–1.9)
ALP SERPL-CCNC: 53 U/L (ref 35–104)
ALT SERPL-CCNC: 26 U/L (ref 12–65)
ANION GAP SERPL CALC-SCNC: 11 MMOL/L (ref 9–18)
AST SERPL-CCNC: 27 U/L (ref 15–37)
BASOPHILS # BLD: 0.1 K/UL (ref 0–0.2)
BASOPHILS NFR BLD: 2 % (ref 0–2)
BILIRUB SERPL-MCNC: 0.4 MG/DL (ref 0–1.2)
BUN SERPL-MCNC: 14 MG/DL (ref 8–23)
CALCIUM SERPL-MCNC: 9.7 MG/DL (ref 8.8–10.2)
CHLORIDE SERPL-SCNC: 102 MMOL/L (ref 98–107)
CHOLEST SERPL-MCNC: 251 MG/DL (ref 0–200)
CO2 SERPL-SCNC: 28 MMOL/L (ref 20–28)
CREAT SERPL-MCNC: 0.82 MG/DL (ref 0.6–1.1)
DIFFERENTIAL METHOD BLD: ABNORMAL
EOSINOPHIL # BLD: 0.1 K/UL (ref 0–0.8)
EOSINOPHIL NFR BLD: 2 % (ref 0.5–7.8)
ERYTHROCYTE [DISTWIDTH] IN BLOOD BY AUTOMATED COUNT: 12.3 % (ref 11.9–14.6)
GLOBULIN SER CALC-MCNC: 3 G/DL (ref 2.3–3.5)
GLUCOSE SERPL-MCNC: 95 MG/DL (ref 70–99)
HCT VFR BLD AUTO: 39.6 % (ref 35.8–46.3)
HDLC SERPL-MCNC: 66 MG/DL (ref 40–60)
HDLC SERPL: 3.8 (ref 0–5)
HGB BLD-MCNC: 12.6 G/DL (ref 11.7–15.4)
IMM GRANULOCYTES # BLD AUTO: 0 K/UL (ref 0–0.5)
IMM GRANULOCYTES NFR BLD AUTO: 0 % (ref 0–5)
LDLC SERPL CALC-MCNC: 169 MG/DL (ref 0–100)
LYMPHOCYTES # BLD: 1.6 K/UL (ref 0.5–4.6)
LYMPHOCYTES NFR BLD: 42 % (ref 13–44)
MCH RBC QN AUTO: 29 PG (ref 26.1–32.9)
MCHC RBC AUTO-ENTMCNC: 31.8 G/DL (ref 31.4–35)
MCV RBC AUTO: 91 FL (ref 82–102)
MONOCYTES # BLD: 0.4 K/UL (ref 0.1–1.3)
MONOCYTES NFR BLD: 10 % (ref 4–12)
NEUTS SEG # BLD: 1.7 K/UL (ref 1.7–8.2)
NEUTS SEG NFR BLD: 44 % (ref 43–78)
NRBC # BLD: 0 K/UL (ref 0–0.2)
PLATELET # BLD AUTO: 226 K/UL (ref 150–450)
PMV BLD AUTO: 9.7 FL (ref 9.4–12.3)
POTASSIUM SERPL-SCNC: 4.4 MMOL/L (ref 3.5–5.1)
PROT SERPL-MCNC: 7 G/DL (ref 6.3–8.2)
RBC # BLD AUTO: 4.35 M/UL (ref 4.05–5.2)
SODIUM SERPL-SCNC: 142 MMOL/L (ref 136–145)
TRIGL SERPL-MCNC: 82 MG/DL (ref 0–150)
VLDLC SERPL CALC-MCNC: 16 MG/DL (ref 6–23)
WBC # BLD AUTO: 3.9 K/UL (ref 4.3–11.1)

## 2024-06-27 DIAGNOSIS — E78.5 DYSLIPIDEMIA: Primary | ICD-10-CM

## 2024-06-27 RX ORDER — ROSUVASTATIN CALCIUM 10 MG/1
10 TABLET, COATED ORAL NIGHTLY
Qty: 90 TABLET | Refills: 3 | Status: SHIPPED | OUTPATIENT
Start: 2024-06-27 | End: 2025-06-27

## 2024-07-31 ENCOUNTER — OFFICE VISIT (OUTPATIENT)
Dept: ORTHOPEDIC SURGERY | Age: 64
End: 2024-07-31

## 2024-07-31 DIAGNOSIS — M72.2 PLANTAR FASCIITIS: Primary | ICD-10-CM

## 2024-07-31 DIAGNOSIS — M79.671 PAIN OF BOTH HEELS: ICD-10-CM

## 2024-07-31 DIAGNOSIS — M79.672 PAIN OF BOTH HEELS: ICD-10-CM

## 2024-07-31 RX ORDER — METHYLPREDNISOLONE ACETATE 40 MG/ML
40 INJECTION, SUSPENSION INTRA-ARTICULAR; INTRALESIONAL; INTRAMUSCULAR; SOFT TISSUE ONCE
Status: COMPLETED | OUTPATIENT
Start: 2024-07-31 | End: 2024-07-31

## 2024-07-31 RX ADMIN — METHYLPREDNISOLONE ACETATE 40 MG: 40 INJECTION, SUSPENSION INTRA-ARTICULAR; INTRALESIONAL; INTRAMUSCULAR; SOFT TISSUE at 13:24

## 2024-07-31 RX ADMIN — METHYLPREDNISOLONE ACETATE 40 MG: 40 INJECTION, SUSPENSION INTRA-ARTICULAR; INTRALESIONAL; INTRAMUSCULAR; SOFT TISSUE at 13:23

## 2024-07-31 NOTE — PROGRESS NOTES
Name: Pooja Gamez  YOB: 1960  Gender: female  MRN: [id number]    CC: Heel pain    HPI:   07/31/2024: Bilateral heel pain: Right more symptomatic    ROS/Meds/PSH/PMH/FH/SH: only reviewed pertinent/relevant information today    Tobacco:  reports that she has never smoked. She has never used smokeless tobacco.     Physical Examination:  Patient appears to be alert and oriented with acceptable appearance.  No obvious distress or SOB  CV: LE acceptable vascular flow, color and capillary refill  Neuro: LE mostly intact light touch sensation   Skin: Bilateral = no gross soft tissue swelling  MS: Standing: Plantigrade: Gait protected pushoff right  Bilateral R > L = plantar medial heel/plantar fascial pain with no neuralgic overlay  Bilateral = no Achilles pain; no plantar fascia or Achilles deficiencies appreciated    XR: Right: Left: Standing AP lateral mortise ankle plus AP oblique foot taken today with os naviculare; no significant plantar or posterior heel enthesopathy; calcaneal tuberosities appear intact; no evidence of collapse arthritis  XR Impression:  As above      Reviewed Test/Records/Documents:   Early 2022: Bilateral heel pain: Injections: Air heels: OOFOS: PT with Gume  12/01/2023: Bilateral recurrent heel pain: Bilateral plantar fascial injections    Timeout: I confirmed with the patient and Junei :   All are in agreement with: Correct patient, planned procedure, procedure site and laterality: Bilateral plantar fascia  Injection: We discussed risks/complications of injection: After acceptance and sterile prep: Each right, left plantar fascia injected 1 cc of Xylocaine, 40 mg Depo-Medrol; she seemed to do well    Assessment:    Bilateral chronic plantar fasciitis    Plan:   The patient and I discussed the above assessment. We explored treatment options.     Unfortunately, she has recurrent bouts of plantar fasciitis  After prior injections and protection she does well and then

## 2024-09-13 DIAGNOSIS — D72.819 LEUKOPENIA, UNSPECIFIED TYPE: ICD-10-CM

## 2024-09-13 DIAGNOSIS — E78.5 DYSLIPIDEMIA: Primary | Chronic | ICD-10-CM

## 2024-09-20 ENCOUNTER — LAB (OUTPATIENT)
Dept: FAMILY MEDICINE CLINIC | Facility: CLINIC | Age: 64
End: 2024-09-20

## 2024-09-20 DIAGNOSIS — E78.5 DYSLIPIDEMIA: Chronic | ICD-10-CM

## 2024-09-20 DIAGNOSIS — D72.819 LEUKOPENIA, UNSPECIFIED TYPE: ICD-10-CM

## 2024-09-20 LAB
BASOPHILS # BLD: 0.1 K/UL (ref 0–0.2)
BASOPHILS NFR BLD: 1 % (ref 0–2)
CHOLEST SERPL-MCNC: 169 MG/DL (ref 0–200)
DIFFERENTIAL METHOD BLD: ABNORMAL
EOSINOPHIL # BLD: 0.1 K/UL (ref 0–0.8)
EOSINOPHIL NFR BLD: 2 % (ref 0.5–7.8)
ERYTHROCYTE [DISTWIDTH] IN BLOOD BY AUTOMATED COUNT: 12.5 % (ref 11.9–14.6)
HCT VFR BLD AUTO: 39.7 % (ref 35.8–46.3)
HDLC SERPL-MCNC: 66 MG/DL (ref 40–60)
HDLC SERPL: 2.6 (ref 0–5)
HGB BLD-MCNC: 12.4 G/DL (ref 11.7–15.4)
IMM GRANULOCYTES # BLD AUTO: 0 K/UL (ref 0–0.5)
IMM GRANULOCYTES NFR BLD AUTO: 0 % (ref 0–5)
LDLC SERPL CALC-MCNC: 91 MG/DL (ref 0–100)
LYMPHOCYTES # BLD: 1.7 K/UL (ref 0.5–4.6)
LYMPHOCYTES NFR BLD: 35 % (ref 13–44)
MCH RBC QN AUTO: 29.2 PG (ref 26.1–32.9)
MCHC RBC AUTO-ENTMCNC: 31.2 G/DL (ref 31.4–35)
MCV RBC AUTO: 93.4 FL (ref 82–102)
MONOCYTES # BLD: 0.6 K/UL (ref 0.1–1.3)
MONOCYTES NFR BLD: 12 % (ref 4–12)
NEUTS SEG # BLD: 2.5 K/UL (ref 1.7–8.2)
NEUTS SEG NFR BLD: 50 % (ref 43–78)
NRBC # BLD: 0 K/UL (ref 0–0.2)
PLATELET # BLD AUTO: 235 K/UL (ref 150–450)
PMV BLD AUTO: 9.7 FL (ref 9.4–12.3)
RBC # BLD AUTO: 4.25 M/UL (ref 4.05–5.2)
TRIGL SERPL-MCNC: 58 MG/DL (ref 0–150)
VLDLC SERPL CALC-MCNC: 12 MG/DL (ref 6–23)
WBC # BLD AUTO: 5 K/UL (ref 4.3–11.1)

## 2024-11-05 SDOH — ECONOMIC STABILITY: INCOME INSECURITY: HOW HARD IS IT FOR YOU TO PAY FOR THE VERY BASICS LIKE FOOD, HOUSING, MEDICAL CARE, AND HEATING?: PATIENT DECLINED

## 2024-11-05 SDOH — ECONOMIC STABILITY: FOOD INSECURITY: WITHIN THE PAST 12 MONTHS, THE FOOD YOU BOUGHT JUST DIDN'T LAST AND YOU DIDN'T HAVE MONEY TO GET MORE.: PATIENT DECLINED

## 2024-11-05 SDOH — ECONOMIC STABILITY: TRANSPORTATION INSECURITY
IN THE PAST 12 MONTHS, HAS LACK OF TRANSPORTATION KEPT YOU FROM MEETINGS, WORK, OR FROM GETTING THINGS NEEDED FOR DAILY LIVING?: PATIENT DECLINED

## 2024-11-05 SDOH — ECONOMIC STABILITY: FOOD INSECURITY: WITHIN THE PAST 12 MONTHS, YOU WORRIED THAT YOUR FOOD WOULD RUN OUT BEFORE YOU GOT MONEY TO BUY MORE.: PATIENT DECLINED

## 2024-11-06 ENCOUNTER — OFFICE VISIT (OUTPATIENT)
Dept: FAMILY MEDICINE CLINIC | Facility: CLINIC | Age: 64
End: 2024-11-06
Payer: COMMERCIAL

## 2024-11-06 VITALS
OXYGEN SATURATION: 96 % | HEART RATE: 75 BPM | SYSTOLIC BLOOD PRESSURE: 128 MMHG | BODY MASS INDEX: 25.03 KG/M2 | HEIGHT: 64 IN | DIASTOLIC BLOOD PRESSURE: 80 MMHG | WEIGHT: 146.6 LBS

## 2024-11-06 DIAGNOSIS — Z00.00 ANNUAL PHYSICAL EXAM: Primary | ICD-10-CM

## 2024-11-06 DIAGNOSIS — R63.5 WEIGHT GAIN: ICD-10-CM

## 2024-11-06 LAB — TSH, 3RD GENERATION: 2.46 UIU/ML (ref 0.27–4.2)

## 2024-11-06 PROCEDURE — 99396 PREV VISIT EST AGE 40-64: CPT | Performed by: FAMILY MEDICINE

## 2024-11-06 ASSESSMENT — ENCOUNTER SYMPTOMS: RESPIRATORY NEGATIVE: 1

## 2024-11-07 NOTE — PROGRESS NOTES
PROGRESS NOTE    SUBJECTIVE:   Pooja Gamez is a 64 y.o. female seen for a follow up visit regarding   Chief Complaint   Patient presents with    Hyperlipidemia     Follow up  Reports gaining 20 lbs in 6 months; reports starting statin around the time she noticed weight gain        HPI:  Here for annual physical and follow-up.  She reports that she is doing well, she is concerned about some weight gain over the past year.  She is scanned every 20 pounds.  She denies changing her eating habits.  Recent lab work was very good.  Her thyroid was not checked she has no other symptoms of hypothyroidism.  There is a temporal association with initiating statin therapy.         Reviewed and updated this visit by provider:  Tobacco  Allergies  Meds  Problems  Med Hx  Surg Hx  Fam Hx           Review of Systems   Respiratory: Negative.     Cardiovascular: Negative.           OBJECTIVE:  Vitals:    11/06/24 0806   BP: 128/80   Site: Left Upper Arm   Cuff Size: Small Adult   Pulse: 75   SpO2: 96%   Weight: 66.5 kg (146 lb 9.6 oz)   Height: 1.626 m (5' 4.02\")        Physical Exam   General: Alert and oriented x3, well-appearing  Neck: No adenopathy, thyromegaly or thyroid nodules  Pulmonary: Normal effort, good airflow, no rales or rhonchi  CVS: Regular rate and rhythm, normal S1, S2, no S3 or S4, no murmurs; no carotid bruits, 2+ pedal pulses      Medical problems and test results were reviewed with the patient today.     Recent Results (from the past 672 hour(s))   TSH    Collection Time: 11/06/24  9:00 AM   Result Value Ref Range    TSH, 3rd Generation 2.460 0.270 - 4.200 uIU/mL       Results for orders placed or performed in visit on 11/06/24   TSH   Result Value Ref Range    TSH, 3rd Generation 2.460 0.270 - 4.200 uIU/mL        ASSESSMENT and PLAN    1. Annual physical exam  2. Weight gain  -     TSH; Future     Will check TSH, suspect will be normal, discussed TLC for weight loss, limiting calories, sweet

## 2024-11-12 ENCOUNTER — OFFICE VISIT (OUTPATIENT)
Dept: ORTHOPEDIC SURGERY | Age: 64
End: 2024-11-12

## 2024-11-12 VITALS — HEIGHT: 64 IN | WEIGHT: 146 LBS | BODY MASS INDEX: 24.92 KG/M2

## 2024-11-12 DIAGNOSIS — M72.2 PLANTAR FASCIITIS: Primary | ICD-10-CM

## 2024-11-12 DIAGNOSIS — M79.672 PAIN OF LEFT HEEL: ICD-10-CM

## 2024-11-12 RX ORDER — PREDNISONE 5 MG/1
TABLET ORAL
Qty: 1 EACH | Refills: 2 | Status: SHIPPED | OUTPATIENT
Start: 2024-11-12

## 2024-11-12 RX ORDER — METHYLPREDNISOLONE ACETATE 40 MG/ML
40 INJECTION, SUSPENSION INTRA-ARTICULAR; INTRALESIONAL; INTRAMUSCULAR; SOFT TISSUE ONCE
Status: COMPLETED | OUTPATIENT
Start: 2024-11-12 | End: 2024-11-12

## 2024-11-12 RX ADMIN — METHYLPREDNISOLONE ACETATE 40 MG: 40 INJECTION, SUSPENSION INTRA-ARTICULAR; INTRALESIONAL; INTRAMUSCULAR; SOFT TISSUE at 15:55

## 2024-11-12 NOTE — PROGRESS NOTES
Name: Pooja Gamez  YOB: 1960  Gender: female  MRN: [id number]    CC: Left heel pain    HPI:   07/31/2024: Bilateral heel pain: Right more symptomatic  11/12/2024: Left heel pain: Recurrent over the last couple weeks    ROS/Meds/PSH/PMH/FH/SH: only reviewed pertinent/relevant information today    Tobacco:  reports that she has never smoked. She has never used smokeless tobacco.     Physical Examination:  Patient appears to be alert and oriented with acceptable appearance.  No obvious distress or SOB  CV: LE acceptable vascular flow, color and capillary refill  Neuro: LE mostly intact light touch sensation   Skin: Left = mild plantar medial heel soft tissue swelling  MS: Standing: Plantigrade: Gait protected pushoff left  Left = plantar medial heel/plantar fascial pain with no neuralgic overlay  Bilateral = no Achilles pain; no plantar fascia or Achilles deficiencies appreciated    XR: Right: Left: Standing AP lateral mortise ankle plus AP oblique foot taken 07/31/2024 with os naviculare; no significant plantar or posterior heel enthesopathy; calcaneal tuberosities appear intact; no evidence of collapse arthritis  XR Impression:  As above      Reviewed Test/Records/Documents:   Early 2022: Bilateral heel pain: Injections: Air heels: OOFOS: PT with Gume  12/01/2023: Bilateral recurrent heel pain: Bilateral plantar fascial injections    Timeout: I confirmed with the patient and Junie :   All are in agreement with: Correct patient, planned procedure, procedure site and laterality: Left plantar fascia  Injection: We discussed risks/complications of injection: After acceptance and sterile prep: Left plantar fascia injected 1 cc of Xylocaine, 40 mg Depo-Medrol; she seemed to do well    Assessment:    Bilateral chronic plantar fasciitis  Left recurrent plantar fasciitis    Plan:   The patient and I discussed the above assessment. We explored treatment options.     07/31/2024:

## 2024-11-25 ENCOUNTER — OFFICE VISIT (OUTPATIENT)
Dept: ORTHOPEDIC SURGERY | Age: 64
End: 2024-11-25
Payer: COMMERCIAL

## 2024-11-25 DIAGNOSIS — M72.2 PLANTAR FASCIITIS, LEFT: Primary | ICD-10-CM

## 2024-11-25 DIAGNOSIS — M72.2 PLANTAR FASCIITIS, RIGHT: ICD-10-CM

## 2024-11-25 PROCEDURE — 99214 OFFICE O/P EST MOD 30 MIN: CPT | Performed by: ORTHOPAEDIC SURGERY

## 2024-11-25 NOTE — PROGRESS NOTES
Name: Pooja Gamez  YOB: 1960  Gender: female  MRN: [id number]    Summary:     Bilateral plantar fasciitis left greater than right     CC: New Patient (Bilateral chronic plantar fasciitis/Left recurrent plantar fasciitis/)       HPI: Pooja Gamez is a 64 y.o. female who presents with New Patient (Bilateral chronic plantar fasciitis/Left recurrent plantar fasciitis/)  .  This patient presents the office today with a longstanding history of bilateral plantar heel pain.  She is tried multiple modalities of conservative treatment including injections physical therapy and shoewear modification without much benefit.    History was obtained by Patient     ROS/Meds/PSH/PMH/FH/SH: I personally reviewed the patients standard intake form.  Below are the pertinents    Tobacco:  reports that she has never smoked. She has never used smokeless tobacco.  Diabetes: None      Physical Examination:    Exam of the left foot shows a gastrocnemius contracture is present with Silfverskiold testing.  She has tenderness palpation plantar medial heel as well as a positive Tinel's over the tarsal tunnel.  She has palpable pulses and intact sensation.    Imaging:   I independently interpreted XR ordered by a different physician, taken from an outside facility of the bilateral feet and ankles which shows no fracture           HOMERO TRAN III, MD           Assessment:   Left chronic Planter fasciitis with tarsal tunnel syndrome, left gastro nevus contracture    Treatment Plan:   4 This is a chronic illness/condition with exacerbation and progression  Treatment at this time: Elective major surgery with procedural risk factors  Studies ordered: NO XR needed @ Next Visit    Weight-bearing status: WBAT        Return to work/work restrictions: none  No medications given    She is lukas try getting in her walker boot for 6 weeks and use her night splint at nighttime.  She is then can wean out of the walker

## 2024-12-03 ENCOUNTER — TELEPHONE (OUTPATIENT)
Dept: ORTHOPEDIC SURGERY | Age: 64
End: 2024-12-03

## 2024-12-03 DIAGNOSIS — M72.2 PLANTAR FASCIITIS, LEFT: Primary | ICD-10-CM

## 2024-12-03 DIAGNOSIS — M72.2 PLANTAR FASCIITIS, RIGHT: ICD-10-CM

## 2024-12-03 NOTE — TELEPHONE ENCOUNTER
Sent Edwin a message to call patient and schedule surgery  
She is asking to speak to Saskia or Eileen regarding possibly setting up surgery. Please give her a call.   
None known

## 2024-12-12 NOTE — PERIOP NOTE
Phone pre-assessment completed.    Verified name & . Order to obtain consent not found in EHR, however patient verifies case posting.    Type 1B surgery,  assessment complete.  Orders not received.    Labs per surgeon: unknown  Labs per anesthesia protocol: none    Patient answered medical/surgical history questions at their best of ability. All prior to admission medications documented in EPIC.    Thank you for completing your phone assessment with me today. The following is a list of Pre-op instructions that you requested. Should you have any questions, please call # [phone].     Surgery Date: 24     Location: [facility]- [address] [facility] [place] ([address]), [place] [address]. Front of building reads Outpatient. [address] is just inside the entrance on the right.   YOU WILL RECEIVE A CALL FROM A PREOP NURSE BY 4PM THE BUSINESS DAY PRIOR TO SURGERY. IF YOU HAVE NOT RECEIVED A CALL BY 4PM, YOU MAY THEN CALL THE NUMBER LISTED BELOW TO REQUEST THE ARRIVAL TIME. DO NOT CALL PRIOR TO 4PM the business day prior to your surgery date. (#[phone] MAIN Preop or Outpatient Center [phone]) If you have any questions on the day of surgery, please call the pre-op department at the telephone number listed.      No food after midnight the night before surgery. UPDATE: a recent change per Benjamin WOOD, Please finish 32 ounces of non-caffeinated clear liquids 2 hours prior to their arrival to avoid dehydration (preferably water).  Once the clear fluids are completed do not chew gum, no hard candy, no mints, no additional fluids (Nothing in mouth including tobacco)      Please take ONLY the following medications on the morning of surgery with a small sip of water: tylenol.  Please continue all regularly scheduled prescription medication the day/night prior to surgery unless otherwise instructed below.     Prescription medication to hold prior to surgery: none, however stop all

## 2024-12-23 ENCOUNTER — ANESTHESIA EVENT (OUTPATIENT)
Dept: SURGERY | Age: 64
End: 2024-12-23
Payer: COMMERCIAL

## 2024-12-23 NOTE — PERIOP NOTE
Preop department called to notify patient of arrival time for scheduled procedure. Instructions given to   - Arrive at [facility] Entrance [address].  - No solid food after midnight & Please drink 32 ounces of water 2 hours prior to your arrival to avoid dehydration unless otherwise indicated. No gum, mints, or ice chips.   - Have a responsible adult to drive patient to the hospital, stay during surgery, and patient will need supervision 24 hours after anesthesia.   - Use antibacterial soap in shower the night before surgery and on the morning of surgery.       Was patient contacted: yes  Voicemail left:   Numbers contacted: [phone]   Arrival time: 0730  Time to complete 32 ounces of water: 0000

## 2024-12-26 ENCOUNTER — ANESTHESIA (OUTPATIENT)
Dept: SURGERY | Age: 64
End: 2024-12-26
Payer: COMMERCIAL

## 2024-12-26 ENCOUNTER — HOSPITAL ENCOUNTER (OUTPATIENT)
Age: 64
Setting detail: OUTPATIENT SURGERY
Discharge: HOME | End: 2024-12-26
Attending: ORTHOPAEDIC SURGERY | Admitting: ORTHOPAEDIC SURGERY
Payer: COMMERCIAL

## 2024-12-26 VITALS
BODY MASS INDEX: 23.05 KG/M2 | WEIGHT: 135 LBS | SYSTOLIC BLOOD PRESSURE: 140 MMHG | TEMPERATURE: 97.6 F | RESPIRATION RATE: 16 BRPM | DIASTOLIC BLOOD PRESSURE: 76 MMHG | OXYGEN SATURATION: 96 % | HEART RATE: 87 BPM | HEIGHT: 64 IN

## 2024-12-26 DIAGNOSIS — G89.18 ACUTE POST-OPERATIVE PAIN: Primary | ICD-10-CM

## 2024-12-26 PROCEDURE — 2500000003 HC RX 250 WO HCPCS: Performed by: NURSE PRACTITIONER

## 2024-12-26 PROCEDURE — 3700000000 HC ANESTHESIA ATTENDED CARE: Performed by: ORTHOPAEDIC SURGERY

## 2024-12-26 PROCEDURE — 2580000003 HC RX 258: Performed by: NURSE PRACTITIONER

## 2024-12-26 PROCEDURE — 3700000001 HC ADD 15 MINUTES (ANESTHESIA): Performed by: ORTHOPAEDIC SURGERY

## 2024-12-26 PROCEDURE — 7100000011 HC PHASE II RECOVERY - ADDTL 15 MIN: Performed by: ORTHOPAEDIC SURGERY

## 2024-12-26 PROCEDURE — 6360000002 HC RX W HCPCS: Performed by: NURSE PRACTITIONER

## 2024-12-26 PROCEDURE — 64445 NJX AA&/STRD SCIATIC NRV IMG: CPT | Performed by: ANESTHESIOLOGY

## 2024-12-26 PROCEDURE — 6360000002 HC RX W HCPCS: Performed by: ANESTHESIOLOGY

## 2024-12-26 PROCEDURE — 2500000003 HC RX 250 WO HCPCS: Performed by: NURSE ANESTHETIST, CERTIFIED REGISTERED

## 2024-12-26 PROCEDURE — 3600000014 HC SURGERY LEVEL 4 ADDTL 15MIN: Performed by: ORTHOPAEDIC SURGERY

## 2024-12-26 PROCEDURE — 7100000000 HC PACU RECOVERY - FIRST 15 MIN: Performed by: ORTHOPAEDIC SURGERY

## 2024-12-26 PROCEDURE — 3600000004 HC SURGERY LEVEL 4 BASE: Performed by: ORTHOPAEDIC SURGERY

## 2024-12-26 PROCEDURE — 64447 NJX AA&/STRD FEMORAL NRV IMG: CPT | Performed by: ANESTHESIOLOGY

## 2024-12-26 PROCEDURE — 2709999900 HC NON-CHARGEABLE SUPPLY: Performed by: ORTHOPAEDIC SURGERY

## 2024-12-26 PROCEDURE — 7100000001 HC PACU RECOVERY - ADDTL 15 MIN: Performed by: ORTHOPAEDIC SURGERY

## 2024-12-26 PROCEDURE — 7100000010 HC PHASE II RECOVERY - FIRST 15 MIN: Performed by: ORTHOPAEDIC SURGERY

## 2024-12-26 PROCEDURE — 6360000002 HC RX W HCPCS: Performed by: NURSE ANESTHETIST, CERTIFIED REGISTERED

## 2024-12-26 RX ORDER — ONDANSETRON 2 MG/ML
4 INJECTION INTRAMUSCULAR; INTRAVENOUS
Status: DISCONTINUED | OUTPATIENT
Start: 2024-12-26 | End: 2024-12-26 | Stop reason: HOSPADM

## 2024-12-26 RX ORDER — SODIUM CHLORIDE 0.9 % (FLUSH) 0.9 %
5-40 SYRINGE (ML) INJECTION EVERY 12 HOURS SCHEDULED
Status: DISCONTINUED | OUTPATIENT
Start: 2024-12-26 | End: 2024-12-26 | Stop reason: HOSPADM

## 2024-12-26 RX ORDER — SODIUM CHLORIDE 0.9 % (FLUSH) 0.9 %
5-40 SYRINGE (ML) INJECTION PRN
Status: DISCONTINUED | OUTPATIENT
Start: 2024-12-26 | End: 2024-12-26 | Stop reason: HOSPADM

## 2024-12-26 RX ORDER — ROPIVACAINE HYDROCHLORIDE 5 MG/ML
INJECTION, SOLUTION EPIDURAL; INFILTRATION; PERINEURAL
Status: COMPLETED | OUTPATIENT
Start: 2024-12-26 | End: 2024-12-26

## 2024-12-26 RX ORDER — SODIUM CHLORIDE, SODIUM LACTATE, POTASSIUM CHLORIDE, CALCIUM CHLORIDE 600; 310; 30; 20 MG/100ML; MG/100ML; MG/100ML; MG/100ML
INJECTION, SOLUTION INTRAVENOUS CONTINUOUS
Status: DISCONTINUED | OUTPATIENT
Start: 2024-12-26 | End: 2024-12-26 | Stop reason: HOSPADM

## 2024-12-26 RX ORDER — ROPIVACAINE HYDROCHLORIDE 7.5 MG/ML
INJECTION, SOLUTION EPIDURAL; PERINEURAL
Status: COMPLETED | OUTPATIENT
Start: 2024-12-26 | End: 2024-12-26

## 2024-12-26 RX ORDER — PROCHLORPERAZINE EDISYLATE 5 MG/ML
5 INJECTION INTRAMUSCULAR; INTRAVENOUS
Status: DISCONTINUED | OUTPATIENT
Start: 2024-12-26 | End: 2024-12-26 | Stop reason: HOSPADM

## 2024-12-26 RX ORDER — MIDAZOLAM HYDROCHLORIDE 2 MG/2ML
2 INJECTION, SOLUTION INTRAMUSCULAR; INTRAVENOUS
Status: COMPLETED | OUTPATIENT
Start: 2024-12-26 | End: 2024-12-26

## 2024-12-26 RX ORDER — LIDOCAINE HYDROCHLORIDE 20 MG/ML
INJECTION, SOLUTION EPIDURAL; INFILTRATION; INTRACAUDAL; PERINEURAL
Status: DISCONTINUED | OUTPATIENT
Start: 2024-12-26 | End: 2024-12-26 | Stop reason: SDUPTHER

## 2024-12-26 RX ORDER — SODIUM CHLORIDE 9 MG/ML
INJECTION, SOLUTION INTRAVENOUS PRN
Status: DISCONTINUED | OUTPATIENT
Start: 2024-12-26 | End: 2024-12-26 | Stop reason: HOSPADM

## 2024-12-26 RX ORDER — OXYCODONE HYDROCHLORIDE 5 MG/1
5 TABLET ORAL EVERY 6 HOURS PRN
Qty: 20 TABLET | Refills: 0 | Status: SHIPPED | OUTPATIENT
Start: 2024-12-26 | End: 2024-12-31

## 2024-12-26 RX ORDER — PROPOFOL 10 MG/ML
INJECTION, EMULSION INTRAVENOUS
Status: DISCONTINUED | OUTPATIENT
Start: 2024-12-26 | End: 2024-12-26 | Stop reason: SDUPTHER

## 2024-12-26 RX ORDER — OXYCODONE HYDROCHLORIDE 5 MG/1
5 TABLET ORAL PRN
Status: DISCONTINUED | OUTPATIENT
Start: 2024-12-26 | End: 2024-12-26 | Stop reason: HOSPADM

## 2024-12-26 RX ORDER — ACETAMINOPHEN 500 MG
1000 TABLET ORAL ONCE
Status: DISCONTINUED | OUTPATIENT
Start: 2024-12-26 | End: 2024-12-26 | Stop reason: HOSPADM

## 2024-12-26 RX ORDER — DIPHENHYDRAMINE HYDROCHLORIDE 50 MG/ML
12.5 INJECTION INTRAMUSCULAR; INTRAVENOUS
Status: DISCONTINUED | OUTPATIENT
Start: 2024-12-26 | End: 2024-12-26 | Stop reason: HOSPADM

## 2024-12-26 RX ORDER — EPHEDRINE SULFATE 5 MG/ML
INJECTION INTRAVENOUS
Status: DISCONTINUED | OUTPATIENT
Start: 2024-12-26 | End: 2024-12-26 | Stop reason: SDUPTHER

## 2024-12-26 RX ORDER — CEPHALEXIN 500 MG/1
500 CAPSULE ORAL 4 TIMES DAILY
Qty: 12 CAPSULE | Refills: 0 | Status: SHIPPED | OUTPATIENT
Start: 2024-12-26

## 2024-12-26 RX ORDER — LIDOCAINE HYDROCHLORIDE 10 MG/ML
1 INJECTION, SOLUTION INFILTRATION; PERINEURAL
Status: DISCONTINUED | OUTPATIENT
Start: 2024-12-26 | End: 2024-12-26 | Stop reason: HOSPADM

## 2024-12-26 RX ORDER — NALOXONE HYDROCHLORIDE 0.4 MG/ML
INJECTION, SOLUTION INTRAMUSCULAR; INTRAVENOUS; SUBCUTANEOUS PRN
Status: DISCONTINUED | OUTPATIENT
Start: 2024-12-26 | End: 2024-12-26 | Stop reason: HOSPADM

## 2024-12-26 RX ORDER — GLYCOPYRROLATE 0.2 MG/ML
INJECTION INTRAMUSCULAR; INTRAVENOUS
Status: DISCONTINUED | OUTPATIENT
Start: 2024-12-26 | End: 2024-12-26 | Stop reason: SDUPTHER

## 2024-12-26 RX ORDER — OXYCODONE HYDROCHLORIDE 5 MG/1
10 TABLET ORAL PRN
Status: DISCONTINUED | OUTPATIENT
Start: 2024-12-26 | End: 2024-12-26 | Stop reason: HOSPADM

## 2024-12-26 RX ORDER — FENTANYL CITRATE 50 UG/ML
100 INJECTION, SOLUTION INTRAMUSCULAR; INTRAVENOUS ONCE
Status: COMPLETED | OUTPATIENT
Start: 2024-12-26 | End: 2024-12-26

## 2024-12-26 RX ADMIN — MIDAZOLAM 2 MG: 1 INJECTION INTRAMUSCULAR; INTRAVENOUS at 08:46

## 2024-12-26 RX ADMIN — ROPIVACAINE HYDROCHLORIDE 18 ML: 5 INJECTION, SOLUTION EPIDURAL; INFILTRATION; PERINEURAL at 08:46

## 2024-12-26 RX ADMIN — PROPOFOL 120 MCG/KG/MIN: 10 INJECTION, EMULSION INTRAVENOUS at 09:25

## 2024-12-26 RX ADMIN — SODIUM CHLORIDE, SODIUM LACTATE, POTASSIUM CHLORIDE, AND CALCIUM CHLORIDE: 600; 310; 30; 20 INJECTION, SOLUTION INTRAVENOUS at 09:20

## 2024-12-26 RX ADMIN — ROPIVACAINE HYDROCHLORIDE 20 ML: 7.5 INJECTION, SOLUTION EPIDURAL; PERINEURAL at 08:46

## 2024-12-26 RX ADMIN — PROPOFOL 50 MG: 10 INJECTION, EMULSION INTRAVENOUS at 09:24

## 2024-12-26 RX ADMIN — CEFAZOLIN 2000 MG: 2 INJECTION, POWDER, FOR SOLUTION INTRAMUSCULAR; INTRAVENOUS at 09:25

## 2024-12-26 RX ADMIN — LIDOCAINE HYDROCHLORIDE 100 MG: 20 INJECTION, SOLUTION EPIDURAL; INFILTRATION; INTRACAUDAL; PERINEURAL at 09:24

## 2024-12-26 RX ADMIN — FENTANYL CITRATE 100 MCG: 0.05 INJECTION, SOLUTION INTRAMUSCULAR; INTRAVENOUS at 08:46

## 2024-12-26 RX ADMIN — ROPIVACAINE HYDROCHLORIDE 10 ML: 5 INJECTION, SOLUTION EPIDURAL; INFILTRATION; PERINEURAL at 08:54

## 2024-12-26 RX ADMIN — GLYCOPYRROLATE 0.4 MG: 0.2 INJECTION INTRAMUSCULAR; INTRAVENOUS at 09:24

## 2024-12-26 RX ADMIN — EPHEDRINE SULFATE 5 MG: 5 INJECTION INTRAVENOUS at 09:53

## 2024-12-26 RX ADMIN — MEPIVACAINE HYDROCHLORIDE 18 ML: 15 INJECTION, SOLUTION EPIDURAL; INFILTRATION at 08:46

## 2024-12-26 NOTE — ANESTHESIA PRE PROCEDURE
Last Rate Last Admin    naloxone 0.4 mg in 10 mL sodium chloride syringe   IntraVENous PRN Benjamin Mcmillan IV, MD        HYDROmorphone (DILAUDID) injection 0.25 mg  0.25 mg IntraVENous Q5 Min PRN Benjamin Mcmillan IV, MD        HYDROmorphone (DILAUDID) injection 0.5 mg  0.5 mg IntraVENous Q5 Min PRN Benjamin Mcmillan IV, MD        oxyCODONE (ROXICODONE) immediate release tablet 5 mg  5 mg Oral PRN Benjamin Mcmillan IV, MD        Or    oxyCODONE (ROXICODONE) immediate release tablet 10 mg  10 mg Oral PRN Benjamin Mcmillan IV, MD        prochlorperazine (COMPAZINE) injection 5 mg  5 mg IntraVENous Once PRN Benjamin Mcmillan IV, MD        ondansetron (ZOFRAN) injection 4 mg  4 mg IntraVENous Once PRN Benjamin Mcmillan IV, MD        diphenhydrAMINE (BENADRYL) injection 12.5 mg  12.5 mg IntraVENous Once PRN Benjamin Mcmillan IV, MD         Current Outpatient Medications   Medication Sig Dispense Refill    cephALEXin (KEFLEX) 500 MG capsule Take 1 capsule by mouth 4 times daily 12 capsule 0    oxyCODONE (ROXICODONE) 5 MG immediate release tablet Take 1 tablet by mouth every 6 hours as needed for Pain for up to 5 days. Intended supply: 5 days. Take lowest dose possible to manage pain Max Daily Amount: 20 mg 20 tablet 0    apixaban (ELIQUIS) 2.5 MG TABS tablet Take 1 tablet by mouth 2 times daily 60 tablet 0    rosuvastatin (CRESTOR) 10 MG tablet Take 1 tablet by mouth nightly 90 tablet 3    escitalopram (LEXAPRO) 20 MG tablet Take 1 tablet by mouth daily (Patient taking differently: Take 1 tablet by mouth at bedtime) 90 tablet 3    Omega-3 Fatty Acids (FISH OIL) 1000 MG CAPS Take 1 capsule by mouth daily      Multiple Vitamin (MULTI-VITAMIN PO) Take 1 tablet by mouth      acetaminophen (TYLENOL) 325 MG tablet Take 2 tablets by mouth every 4 hours as needed      vitamin D (CHOLECALCIFEROL) 25 MCG (1000 UT) TABS tablet Take 1 tablet by mouth daily      fluocinonide (LIDEX) 0.05 % gel as needed         Allergies:    Allergies   Allergen

## 2024-12-26 NOTE — ANESTHESIA PROCEDURE NOTES
Peripheral Block    Patient location during procedure: procedure area  Reason for block: post-op pain management and at surgeon's request  Start time: 12/26/2024 8:54 AM  End time: 12/26/2024 8:55 AM  Staffing  Performed: anesthesiologist   Anesthesiologist: Benjamin Mcmillan IV, MD  Performed by: Benjamin Mcmillan IV, MD  Authorized by: eBnjamin Mcmillan IV, MD    Preanesthetic Checklist  Completed: patient identified, IV checked, site marked, risks and benefits discussed, surgical/procedural consents, equipment checked, pre-op evaluation, timeout performed, anesthesia consent given, oxygen available and monitors applied/VS acknowledged  Peripheral Block   Patient position: supine  Prep: ChloraPrep  Provider prep: mask  Patient monitoring: cardiac monitor, continuous pulse ox, frequent blood pressure checks, IV access, oxygen and responsive to questions  Block type: Saphenous  Laterality: left  Injection technique: single-shot  Guidance: ultrasound guided    Needle   Needle gauge: 25g.  Needle localization: anatomical landmarks  Needle length: 5 cm  Assessment   Injection assessment: negative aspiration for heme, no paresthesia on injection and no intravascular symptoms  Slow fractionated injection: yes  Hemodynamics: stable  Outcomes: uncomplicated and patient tolerated procedure well    Additional Notes  Mixed 1:1 Ropivicaine 0.5% 10ml and 1.5% Mepivicaine 10ml, 15ml injected at the proximal tibia and 5ml injected at the ankle.  Medications Administered  mepivacaine (CARBOCAINE) injection 1.5% - Perineural   10 mL - 12/26/2024 8:54:00 AM  ropivacaine (NAROPIN) injection 0.5% - Perineural   10 mL - 12/26/2024 8:54:00 AM

## 2024-12-26 NOTE — ANESTHESIA POSTPROCEDURE EVALUATION
Department of Anesthesiology  Postprocedure Note    Patient: Pooja Gamez  MRN: [id number]  YOB: 1960  Date of evaluation: 12/26/2024    Procedure Summary       Date: 12/26/24 Room / Location: [facility] OP OR 02 / [facility]    Anesthesia Start: 0920 Anesthesia Stop: 1000    Procedures:       Left open plantar fascia release (Left: Foot)      Tarsal Tunnel release (Left: Toes)      Left gastrocnemius recession (Left: Foot) Diagnosis:       Plantar fasciitis, left      Plantar fasciitis, right      (Plantar fasciitis, left [M72.2])      (Plantar fasciitis, right [M72.2])    Surgeons: Benjamin Washington III, MD Responsible Provider: Benjamin Mcmillan IV, MD    Anesthesia Type: TIVA ASA Status: 2            Anesthesia Type: TIVA    Lyn Phase I: Lyn Score: 8    Lyn Phase II: Lyn Score: 10    Anesthesia Post Evaluation    Patient location during evaluation: PACU  Patient participation: complete - patient participated  Level of consciousness: awake and alert  Airway patency: patent  Nausea & Vomiting: no nausea and no vomiting  Cardiovascular status: hemodynamically stable  Respiratory status: acceptable, nonlabored ventilation and spontaneous ventilation  Hydration status: euvolemic  Comments: BP (!) 140/76   Pulse 87   Temp 97.6 °F (36.4 °C) (Skin)   Resp 16   Ht 1.632 m (5' 4.25\")   Wt 61.2 kg (135 lb)   SpO2 96%   BMI 22.99 kg/m²     Multimodal analgesia pain management approach  Pain management: adequate    No notable events documented.

## 2024-12-26 NOTE — OP NOTE
Operative Note    Patient:Pooja Gamez  MRN: [id number]    Date Of Surgery: 12/26/2024    Surgeon: Benjamin Washington MD    Assistant Surgeon: None    Pre Op Diagnosis:  Pre-Op Diagnosis Codes:      * Plantar fasciitis, left [M72.2]     * Plantar fasciitis, right [M72.2]      Post Op Diagnosis:   same    Procedures Performed:  Left gastrocnemius recession, 02508  Left open tarsal tunnel release, 74346  Left open plantar fasciotomy, 36173    Implants:   * No implants in log *    Anesthesia:  Regional    Blood Loss:  minimal    Tourniquet:  Estimated calf 25 minutes    Pre Operative Abx:   Ancef 2g            Pre Operative Course:  Pooja Gamez is a 64 y.o. female who has a history of left chronic Planter fasciitis with tarsal tunnel syndrome and gastrocnemius contracture.    Operation In Detail:  Patient was evaluated in the preoperative area.  We had a long discussion about the procedure and postoperative protocols.  The patient was then brought back to the operating room suite and placed in the operating room table.  A timeout was taken to identify the patient, procedure being performed, and laterality.  After this the patient was prepped and draped in the normal sterile fashion using a Betadine solution and/or a ChloraPrep solution.  A timeout was then taken to identify the patient his name, date of birth, laterality, and procedure being performed.  We also identified allergies and any concerns about the operation.  Attention was then placed to the operative extremity.  A block was placed by the department of anesthesia.  In a preop surgical timeout the left lower extremity was identified as correct surgical site and prepped and draped in a standard sterile fashions and ChloraPrep solution.  A gastrocnemius recession was performed with direct posterior approach while taking care to protect the sural nerve.  This wound was irrigated and closed using Monocryl nylon sutures.  A medial approach to

## 2024-12-26 NOTE — DISCHARGE INSTRUCTIONS
failure or if you experience any of the following symptoms:  Weight gain of 3 pounds or more overnight or 5 pounds in a week, increased swelling in our hands or feet or shortness of breath while lying flat in bed.  Please call your doctor as soon as you notice any of these symptoms; do not wait until your next office visit.

## 2024-12-26 NOTE — ANESTHESIA PROCEDURE NOTES
Peripheral Block    Patient location during procedure: procedure area  Reason for block: post-op pain management and at surgeon's request  Start time: 12/26/2024 8:46 AM  End time: 12/26/2024 8:53 AM  Staffing  Performed: anesthesiologist   Anesthesiologist: Benjamin Mcmillan IV, MD  Performed by: Benjamin Mcmillan IV, MD  Authorized by: Benjamin Mcmillan IV, MD    Preanesthetic Checklist  Completed: patient identified, IV checked, site marked, risks and benefits discussed, surgical/procedural consents, pre-op evaluation, timeout performed, anesthesia consent given, oxygen available and monitors applied/VS acknowledged  Peripheral Block   Patient position: supine  Prep: ChloraPrep  Provider prep: mask  Patient monitoring: cardiac monitor, continuous pulse ox, frequent blood pressure checks, IV access, oxygen and responsive to questions  Block type: Sciatic  Popliteal  Laterality: left  Injection technique: single-shot  Guidance: ultrasound guided    Needle   Needle type: insulated echogenic nerve stimulator needle   Needle gauge: 20 G  Needle localization: ultrasound guidance  Needle length: 10 cm  Assessment   Injection assessment: negative aspiration for heme, no paresthesia on injection, local visualized surrounding nerve on ultrasound and no intravascular symptoms  Slow fractionated injection: yes  Hemodynamics: stable  Outcomes: uncomplicated and patient tolerated procedure well    Additional Notes  Active ultrasound used to identify the location of the sciatic nerve in the popliteal fossa and also surrounding structures, and the block was performed using real time ultrasound, and permanent image saved.    Medications Administered  mepivacaine (CARBOCAINE) injection 1.5% - Perineural   18 mL - 12/26/2024 8:46:00 AM  ROPivacaine (NAROPIN) injection 0.75% - Perineural   20 mL - 12/26/2024 8:46:00 AM  ropivacaine (NAROPIN) injection 0.5% - Perineural   18 mL - 12/26/2024 8:46:00 AM

## 2024-12-26 NOTE — H&P
Outpatient Surgery History and Physical:  Pooja Gamez was seen and examined.    CHIEF COMPLAINT:   left foot.     PE:   /66   Pulse 61   Temp 98.1 °F (36.7 °C) (Oral)   Resp 16   Ht 1.632 m (5' 4.25\")   Wt 61.2 kg (135 lb)   SpO2 98%   BMI 22.99 kg/m²     Heart:   Regular rhythm      Lungs:  Are clear      Past Medical History:    Past Medical History:   Diagnosis Date    Anxiety and depression     Cervical disc disorder 1/8/2015    CRP elevated 1/8/2015    Depression 1/8/2015    Fracture 8/10/15    left foot    Insomnia     at times    Pure hypercholesterolemia 1/8/2015       Surgical History:   Past Surgical History:   Procedure Laterality Date    ANTERIOR CERVICAL DISCECTOMY W/ FUSION  2010    plate    APPENDECTOMY  1978    COLONOSCOPY N/A 3/22/2022    COLONOSCOPY/ 23 performed by Inocencia Cerda MD at [facility] ENDOSCOPY    DILATION AND CURETTAGE OF UTERUS      ENDOMETRIAL ABLATION  2014    GYN  1994    endometrial tumor (non-cancerous)    LAPAROTOMY  2017    bengn mass removed    ORTHOPEDIC SURGERY Left 12/10/2020    Ganglion cyst of volar aspect of left wrist  Trigger thumb, left thumb  Ganglion cyst of volar aspect of left wrist [M67.432] Trigger thumb, left thumb    PELVIC LAPAROSCOPY  2000    to remove scar tissue and endometrial tumor       Social History: Patient  reports that she has never smoked. She has never used smokeless tobacco. She reports current alcohol use of about 3.0 - 4.0 standard drinks of alcohol per week. She reports that she does not use drugs.    Family History:   Family History   Problem Relation Age of Onset    No Known Problems Sister     No Known Problems Brother     Cancer Father     Heart Disease Father         had CABG- no MI    Hypertension Father     Diabetes Father     Arthritis Father     Lung Disease Mother        Allergies: Reviewed per EMR  Meloxicam and Clarithromycin    Medications:    Prior to Admission medications    Medication Sig Start Date End Date

## 2024-12-30 ENCOUNTER — TELEPHONE (OUTPATIENT)
Dept: ORTHOPEDIC SURGERY | Age: 64
End: 2024-12-30

## 2024-12-30 NOTE — TELEPHONE ENCOUNTER
Spoke to patient told her at least to post op appt then reassess at that time if she needs a new work note with limited duties or not.

## 2025-01-07 ENCOUNTER — TELEPHONE (OUTPATIENT)
Dept: ORTHOPEDIC SURGERY | Age: 65
End: 2025-01-07

## 2025-01-07 NOTE — TELEPHONE ENCOUNTER
Please call patient , she is worried about the weather and her not getting her stitches out and whatever she has off her foot,  since she lives a little ways away to you have anything sooner she can come too?

## 2025-01-08 ENCOUNTER — OFFICE VISIT (OUTPATIENT)
Dept: ORTHOPEDIC SURGERY | Age: 65
End: 2025-01-08

## 2025-01-08 ENCOUNTER — CLINICAL DOCUMENTATION (OUTPATIENT)
Dept: ORTHOPEDIC SURGERY | Age: 65
End: 2025-01-08

## 2025-01-08 DIAGNOSIS — M72.2 PLANTAR FASCIITIS, LEFT: Primary | ICD-10-CM

## 2025-01-08 PROCEDURE — M5017 MISC EVENUP: HCPCS | Performed by: NURSE PRACTITIONER

## 2025-01-08 PROCEDURE — M5002 MISC BOOT SOCK: HCPCS | Performed by: NURSE PRACTITIONER

## 2025-01-08 PROCEDURE — 99024 POSTOP FOLLOW-UP VISIT: CPT | Performed by: NURSE PRACTITIONER

## 2025-01-08 NOTE — PROGRESS NOTES
Patient was given extra two Boot Socks.  Patient was fitted and instructed on an Even Up for the right foot.  Patient read and signed documenting they understand and agree to [facility]'s current DME return policy.

## 2025-01-08 NOTE — PROGRESS NOTES
Name: Pooja Gamez  YOB: 1960  Gender: female  MRN: [id number]    Procedure Performed:  Left gastrocnemius recession  Left open tarsal tunnel release  Left open plantar fasciotomy        Date of Procedure: 12/26/2024    Subjective: Patient reports doing okay.  She was very grateful to have her appointment scheduled [place] imminent bad weather approaching.       Physical Examination: Incisional areas look okay today and do appear to be healing well without signs of infection.  She has a negative Tinel sign over the tarsal tunnel.  There is residual bruising noted to the affected extremity.  She wiggles all toes effectively without pain.  She has no signs of DVT at this time, denies of any calf or behind the knee pain does present with a negative Homans' sign.  There is expected swelling to the affected extremity.  She does have some tenderness with palpation to the plantar fascia from point of the heel as expected.        Imaging:   No imaging reviewed          Assessment:   Status post left open tarsal tunnel and plantar fascia release with gastrocnemius recession.  We discussed progression care today as well as expectations until next well visit.  Question concerns were addressed, she verbalized understanding today's conversation.      Plan:   3 This is stable chronic illness/condition  Treatment at this time: Sutures removed, Steri-Strips were placed.  Patient will be placed into a cam walker boot as a matter medical necessity where she may progressively bear weight on the affected extremity she can tolerate and swelling allows. The affected extremity may now get wet including showering, encouragement was given for the patient to soak with Epsom salts to help with additional incisional healing and swelling purposes.  She May discontinue daily aspirin therapy as DVT prophylaxis once comfortable in the walker boot.  The patient  will

## 2025-01-09 ENCOUNTER — CLINICAL DOCUMENTATION (OUTPATIENT)
Dept: ORTHOPEDIC SURGERY | Age: 65
End: 2025-01-09

## 2025-01-10 ENCOUNTER — CLINICAL DOCUMENTATION (OUTPATIENT)
Dept: ORTHOPEDIC SURGERY | Age: 65
End: 2025-01-10

## 2025-02-03 ENCOUNTER — APPOINTMENT (OUTPATIENT)
Dept: URBAN - METROPOLITAN AREA CLINIC 329 | Facility: CLINIC | Age: 65
Setting detail: DERMATOLOGY
End: 2025-02-03

## 2025-02-03 DIAGNOSIS — D18.0 HEMANGIOMA: ICD-10-CM

## 2025-02-03 DIAGNOSIS — L82.1 OTHER SEBORRHEIC KERATOSIS: ICD-10-CM

## 2025-02-03 DIAGNOSIS — Z71.89 OTHER SPECIFIED COUNSELING: ICD-10-CM

## 2025-02-03 DIAGNOSIS — Z12.83 ENCOUNTER FOR SCREENING FOR MALIGNANT NEOPLASM OF SKIN: ICD-10-CM

## 2025-02-03 DIAGNOSIS — L57.8 OTHER SKIN CHANGES DUE TO CHRONIC EXPOSURE TO NONIONIZING RADIATION: ICD-10-CM

## 2025-02-03 DIAGNOSIS — D22 MELANOCYTIC NEVI: ICD-10-CM

## 2025-02-03 DIAGNOSIS — L57.0 ACTINIC KERATOSIS: ICD-10-CM

## 2025-02-03 DIAGNOSIS — L82.0 INFLAMED SEBORRHEIC KERATOSIS: ICD-10-CM

## 2025-02-03 DIAGNOSIS — L81.4 OTHER MELANIN HYPERPIGMENTATION: ICD-10-CM

## 2025-02-03 PROBLEM — D18.01 HEMANGIOMA OF SKIN AND SUBCUTANEOUS TISSUE: Status: ACTIVE | Noted: 2025-02-03

## 2025-02-03 PROBLEM — D22.5 MELANOCYTIC NEVI OF TRUNK: Status: ACTIVE | Noted: 2025-02-03

## 2025-02-03 PROBLEM — D22.72 MELANOCYTIC NEVI OF LEFT LOWER LIMB, INCLUDING HIP: Status: ACTIVE | Noted: 2025-02-03

## 2025-02-03 PROBLEM — D22.71 MELANOCYTIC NEVI OF RIGHT LOWER LIMB, INCLUDING HIP: Status: ACTIVE | Noted: 2025-02-03

## 2025-02-03 PROBLEM — D22.61 MELANOCYTIC NEVI OF RIGHT UPPER LIMB, INCLUDING SHOULDER: Status: ACTIVE | Noted: 2025-02-03

## 2025-02-03 PROCEDURE — 99213 OFFICE O/P EST LOW 20 MIN: CPT | Mod: 25

## 2025-02-03 PROCEDURE — ? TREATMENT REGIMEN

## 2025-02-03 PROCEDURE — ? LIQUID NITROGEN

## 2025-02-03 PROCEDURE — 17000 DESTRUCT PREMALG LESION: CPT | Mod: 59

## 2025-02-03 PROCEDURE — 17110 DESTRUCTION B9 LES UP TO 14: CPT

## 2025-02-03 PROCEDURE — 17003 DESTRUCT PREMALG LES 2-14: CPT | Mod: 59

## 2025-02-03 PROCEDURE — ? COUNSELING

## 2025-02-03 PROCEDURE — ? SUNSCREEN RECOMMENDATIONS

## 2025-02-03 PROCEDURE — ? FULL BODY SKIN EXAM

## 2025-02-03 ASSESSMENT — LOCATION SIMPLE DESCRIPTION DERM
LOCATION SIMPLE: RIGHT CHEEK
LOCATION SIMPLE: LEFT CALF
LOCATION SIMPLE: RIGHT FOREARM
LOCATION SIMPLE: ABDOMEN
LOCATION SIMPLE: UPPER BACK
LOCATION SIMPLE: CHEST
LOCATION SIMPLE: LEFT CHEEK
LOCATION SIMPLE: RIGHT UPPER BACK
LOCATION SIMPLE: LEFT POSTERIOR THIGH
LOCATION SIMPLE: ANTERIOR SCALP
LOCATION SIMPLE: RIGHT THIGH
LOCATION SIMPLE: INFERIOR FOREHEAD
LOCATION SIMPLE: LEFT UPPER ARM
LOCATION SIMPLE: NOSE
LOCATION SIMPLE: RIGHT CLAVICULAR SKIN
LOCATION SIMPLE: RIGHT UPPER ARM
LOCATION SIMPLE: LEFT UPPER BACK

## 2025-02-03 ASSESSMENT — LOCATION DETAILED DESCRIPTION DERM
LOCATION DETAILED: LEFT CENTRAL MALAR CHEEK
LOCATION DETAILED: RIGHT SUPERIOR MEDIAL UPPER BACK
LOCATION DETAILED: RIGHT NASAL ROOT
LOCATION DETAILED: LEFT DISTAL CALF
LOCATION DETAILED: LEFT PROXIMAL POSTERIOR THIGH
LOCATION DETAILED: LEFT INFERIOR UPPER BACK
LOCATION DETAILED: RIGHT MEDIAL UPPER BACK
LOCATION DETAILED: RIGHT VENTRAL DISTAL FOREARM
LOCATION DETAILED: LEFT DISTAL POSTERIOR THIGH
LOCATION DETAILED: EPIGASTRIC SKIN
LOCATION DETAILED: SUPERIOR THORACIC SPINE
LOCATION DETAILED: RIGHT CLAVICULAR SKIN
LOCATION DETAILED: RIGHT ANTERIOR DISTAL THIGH
LOCATION DETAILED: RIGHT SUPERIOR MEDIAL MALAR CHEEK
LOCATION DETAILED: LEFT ANTERIOR DISTAL UPPER ARM
LOCATION DETAILED: MID-FRONTAL SCALP
LOCATION DETAILED: RIGHT PROXIMAL DORSAL FOREARM
LOCATION DETAILED: INFERIOR MID FOREHEAD
LOCATION DETAILED: UPPER STERNUM
LOCATION DETAILED: RIGHT ANTERIOR DISTAL UPPER ARM

## 2025-02-03 ASSESSMENT — LOCATION ZONE DERM
LOCATION ZONE: LEG
LOCATION ZONE: TRUNK
LOCATION ZONE: ARM
LOCATION ZONE: NOSE
LOCATION ZONE: SCALP
LOCATION ZONE: FACE

## 2025-02-03 NOTE — PROCEDURE: LIQUID NITROGEN
Render Post-Care Instructions In Note?: no
Spray Paint Text: The liquid nitrogen was applied to the skin utilizing a spray paint frosting technique.
Show Applicator Variable?: Yes
Medical Necessity Clause: This procedure was medically necessary because the lesions that were treated were: bleeding and enlarging
Post-Care Instructions: I reviewed with the patient in detail post-care instructions. Patient is to wear sunprotection, and avoid picking at any of the treated lesions. Pt may apply Vaseline to crusted or scabbing areas.
Detail Level: Detailed
Medical Necessity Information: It is in your best interest to select a reason for this procedure from the list below. All of these items fulfill various CMS LCD requirements except the new and changing color options.
Consent: The patient's consent was obtained including but not limited to risks of crusting, scabbing, blistering, scarring, darker or lighter pigmentary change, recurrence, incomplete removal and infection.
Duration Of Freeze Thaw-Cycle (Seconds): 0

## 2025-02-03 NOTE — HPI: EVALUATION OF SKIN LESION(S)
What Type Of Note Output Would You Prefer (Optional)?: Bullet Format
Hpi Title: Evaluation of Skin Lesions
How Severe Are Your Spot(S)?: mild
Additional History: Pt states that she has lesions of concern on her face that scab up and reoccur. Pt states they have lesion on her back that is tender.

## 2025-02-05 ENCOUNTER — OFFICE VISIT (OUTPATIENT)
Dept: ORTHOPEDIC SURGERY | Age: 65
End: 2025-02-05

## 2025-02-05 DIAGNOSIS — M72.2 PLANTAR FASCIITIS, LEFT: Primary | ICD-10-CM

## 2025-02-05 PROCEDURE — 99024 POSTOP FOLLOW-UP VISIT: CPT | Performed by: NURSE PRACTITIONER

## 2025-02-05 NOTE — PROGRESS NOTES
Name: Pooja Gamez  YOB: 1960  Gender: female  MRN: [id number]    Procedure Performed:  Left gastrocnemius recession  Left open tarsal tunnel release  Left open plantar fasciotomy           Date of Procedure: 12/26/2024      Subjective: Patient reports overall she feels like she is progressing well, she is getting around well in the walker boot.  She does notice that on occasion her incision which is not fully healed will ooze on her sock.  She also reports the outer portion of her foot as well as her heel being still pretty numb since her last visit.      Physical Examination: The incisional area to the medial heel is not currently healed yet and is scabbed but does not show any signs of infection.  She has a negative Tinel's sign over the tarsal tunnel.  She does have some tenderness with palpation to the plantar fascial insertion point of the heel.  There is expected swelling to the affected area.  She wiggles her toes effectively without pain.        Imaging:   No imaging reviewed          Assessment:   Status post left open plantar fasciotomy with tarsal tunnel release.  We discussed the overall process of recovery being 6 months or more to include relieving of numbness and nervy type sensations as well as decrease in swelling and increase in range of motion and strength.      Plan:   3 This is stable chronic illness/condition  Treatment at this time: Patient may now begin to wean out of the walker boot back in comfortable shoes as she feels she can tolerate.  She will begin physical therapy, an order was given today as well as a list of locations for them to choose from.  She will begin diligent exercises at home to increase joint mobility of the affected foot and ankle.  She may resume physical activities as tolerated excluding high-impact at this time.  Continued Epsom salt soaking was encouraged for additional incisional healing.  The patient will follow-up in approximately 6

## 2025-03-19 ENCOUNTER — OFFICE VISIT (OUTPATIENT)
Age: 65
End: 2025-03-19

## 2025-03-19 ENCOUNTER — OFFICE VISIT (OUTPATIENT)
Dept: ORTHOPEDIC SURGERY | Age: 65
End: 2025-03-19

## 2025-03-19 DIAGNOSIS — M72.2 PLANTAR FASCIITIS, LEFT: Primary | ICD-10-CM

## 2025-03-19 DIAGNOSIS — M79.672 PAIN IN LEFT FOOT: ICD-10-CM

## 2025-03-19 DIAGNOSIS — M18.11 ARTHRITIS OF CARPOMETACARPAL (CMC) JOINT OF RIGHT THUMB: Primary | ICD-10-CM

## 2025-03-19 PROCEDURE — 99024 POSTOP FOLLOW-UP VISIT: CPT | Performed by: NURSE PRACTITIONER

## 2025-03-19 RX ORDER — BETAMETHASONE SODIUM PHOSPHATE AND BETAMETHASONE ACETATE 3; 3 MG/ML; MG/ML
6 INJECTION, SUSPENSION INTRA-ARTICULAR; INTRALESIONAL; INTRAMUSCULAR; SOFT TISSUE ONCE
Status: COMPLETED | OUTPATIENT
Start: 2025-03-19 | End: 2025-03-19

## 2025-03-19 RX ORDER — IBUPROFEN 800 MG/1
800 TABLET, FILM COATED ORAL 3 TIMES DAILY
Qty: 60 TABLET | Refills: 0 | Status: SHIPPED | OUTPATIENT
Start: 2025-03-19 | End: 2025-04-16

## 2025-03-19 RX ORDER — IBUPROFEN 600 MG/1
600 TABLET, FILM COATED ORAL 3 TIMES DAILY PRN
Qty: 30 TABLET | Refills: 0 | Status: CANCELLED | OUTPATIENT
Start: 2025-03-19

## 2025-03-19 RX ADMIN — BETAMETHASONE SODIUM PHOSPHATE AND BETAMETHASONE ACETATE 6 MG: 3; 3 INJECTION, SUSPENSION INTRA-ARTICULAR; INTRALESIONAL; INTRAMUSCULAR; SOFT TISSUE at 11:12

## 2025-03-19 NOTE — PROGRESS NOTES
Orthopaedic Hand Surgery Note    Name: Pooja Gamez  YOB: 1960  Gender: female  MRN: [id number]    CC: Follow up for right thumb Carpometacarpal Joint osteoarthritis    HPI: Patient is a 64 y.o. female who is here with right thumb pain.  She reports lifting weights at the gym.  She did not think she lifted anything too heavy or overdid it in any way.  She has noticed right thumb pain and swelling.  She is recovering from foot and ankle surgery by Dr. Washington.  She leaves to go to the beach tomorrow.    ROS/Meds/PSH/PMH/FH/SH: I personally reviewed the patients standard intake form.  Pertinents are discussed in the HPI    Physical Examination:  Musculoskeletal:   Examination of the right upper extremity demonstrates normal sensation in median, ulnar and radial distribution, negative carpal tunnel compression and Phalen test, cap refill < 5 seconds in all fingers.  Mild prominence and instability of the base of the first metacarpal. CMC grind is positive for pain and crepitus. Thumb MCP joint hyperextends 0 degrees. No pain at the radial styloid.    Imaging / Electrodiagnostic Tests:     None    Assessment:     ICD-10-CM    1. Arthritis of carpometacarpal (CMC) joint of right thumb  M18.11 betamethasone acetate-betamethasone sodium phosphate (CELESTONE) injection 6 mg          Plan:  We again discussed the diagnosis and different treatment options. We discussed observation, day/night splinting, cortisone injection(s) and surgical reconstruction with trapeziectomy and suspension arthroplasty and the risks and benefits of all were clearly outlined.  We discussed the fact that the vast majority of thumb CMC arthritis causes persistent chronic pain and that surgical reconstruction is the endpoint for patients whose symptoms are not properly alleviated with conservative measures but the vast majority of patients end up having surgery. After discussing all options in detail the patient elects to

## 2025-03-27 ENCOUNTER — TRANSCRIBE ORDERS (OUTPATIENT)
Dept: SCHEDULING | Age: 65
End: 2025-03-27

## 2025-03-27 DIAGNOSIS — Z12.31 OTHER SCREENING MAMMOGRAM: Primary | ICD-10-CM

## 2025-05-05 ENCOUNTER — RESULTS FOLLOW-UP (OUTPATIENT)
Dept: FAMILY MEDICINE CLINIC | Facility: CLINIC | Age: 65
End: 2025-05-05

## 2025-05-20 ENCOUNTER — OFFICE VISIT (OUTPATIENT)
Dept: FAMILY MEDICINE CLINIC | Facility: CLINIC | Age: 65
End: 2025-05-20

## 2025-05-20 VITALS
HEIGHT: 64 IN | WEIGHT: 142.2 LBS | RESPIRATION RATE: 16 BRPM | BODY MASS INDEX: 24.28 KG/M2 | SYSTOLIC BLOOD PRESSURE: 128 MMHG | DIASTOLIC BLOOD PRESSURE: 80 MMHG

## 2025-05-20 DIAGNOSIS — S16.1XXD REPETITIVE STRAIN INJURY OF CERVICAL SPINE, SUBSEQUENT ENCOUNTER: ICD-10-CM

## 2025-05-20 DIAGNOSIS — F33.0 MILD EPISODE OF RECURRENT MAJOR DEPRESSIVE DISORDER: ICD-10-CM

## 2025-05-20 DIAGNOSIS — L82.0 INFLAMED SEBORRHEIC KERATOSIS: Primary | ICD-10-CM
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
## 2025-05-20 DIAGNOSIS — Z98.1 HISTORY OF FUSION OF CERVICAL SPINE: ICD-10-CM

## 2025-05-20 SDOH — ECONOMIC STABILITY: INCOME INSECURITY: IN THE LAST 12 MONTHS, WAS THERE A TIME WHEN YOU WERE NOT ABLE TO PAY THE MORTGAGE OR RENT ON TIME?: NO

## 2025-05-20 SDOH — ECONOMIC STABILITY: FOOD INSECURITY: WITHIN THE PAST 12 MONTHS, THE FOOD YOU BOUGHT JUST DIDN'T LAST AND YOU DIDN'T HAVE MONEY TO GET MORE.: NEVER TRUE

## 2025-05-20 SDOH — ECONOMIC STABILITY: FOOD INSECURITY: WITHIN THE PAST 12 MONTHS, YOU WORRIED THAT YOUR FOOD WOULD RUN OUT BEFORE YOU GOT MONEY TO BUY MORE.: NEVER TRUE

## 2025-05-20 SDOH — ECONOMIC STABILITY: TRANSPORTATION INSECURITY
IN THE PAST 12 MONTHS, HAS LACK OF TRANSPORTATION KEPT YOU FROM MEETINGS, WORK, OR FROM GETTING THINGS NEEDED FOR DAILY LIVING?: NO

## 2025-05-20 SDOH — ECONOMIC STABILITY: TRANSPORTATION INSECURITY
IN THE PAST 12 MONTHS, HAS THE LACK OF TRANSPORTATION KEPT YOU FROM MEDICAL APPOINTMENTS OR FROM GETTING MEDICATIONS?: NO

## 2025-05-20 ASSESSMENT — PATIENT HEALTH QUESTIONNAIRE - PHQ9
1. LITTLE INTEREST OR PLEASURE IN DOING THINGS: NOT AT ALL
9. THOUGHTS THAT YOU WOULD BE BETTER OFF DEAD, OR OF HURTING YOURSELF: NOT AT ALL
4. FEELING TIRED OR HAVING LITTLE ENERGY: NOT AT ALL
3. TROUBLE FALLING OR STAYING ASLEEP: NOT AT ALL
10. IF YOU CHECKED OFF ANY PROBLEMS, HOW DIFFICULT HAVE THESE PROBLEMS MADE IT FOR YOU TO DO YOUR WORK, TAKE CARE OF THINGS AT HOME, OR GET ALONG WITH OTHER PEOPLE: NOT DIFFICULT AT ALL
1. LITTLE INTEREST OR PLEASURE IN DOING THINGS: NOT AT ALL
10. IF YOU CHECKED OFF ANY PROBLEMS, HOW DIFFICULT HAVE THESE PROBLEMS MADE IT FOR YOU TO DO YOUR WORK, TAKE CARE OF THINGS AT HOME, OR GET ALONG WITH OTHER PEOPLE: NOT DIFFICULT AT ALL
2. FEELING DOWN, DEPRESSED OR HOPELESS: NOT AT ALL
7. TROUBLE CONCENTRATING ON THINGS, SUCH AS READING THE NEWSPAPER OR WATCHING TELEVISION: NOT AT ALL
5. POOR APPETITE OR OVEREATING: NOT AT ALL
SUM OF ALL RESPONSES TO PHQ QUESTIONS 1-9: 0
9. THOUGHTS THAT YOU WOULD BE BETTER OFF DEAD, OR OF HURTING YOURSELF: NOT AT ALL
8. MOVING OR SPEAKING SO SLOWLY THAT OTHER PEOPLE COULD HAVE NOTICED. OR THE OPPOSITE, BEING SO FIGETY OR RESTLESS THAT YOU HAVE BEEN MOVING AROUND A LOT MORE THAN USUAL: NOT AT ALL
6. FEELING BAD ABOUT YOURSELF - OR THAT YOU ARE A FAILURE OR HAVE LET YOURSELF OR YOUR FAMILY DOWN: NOT AT ALL
6. FEELING BAD ABOUT YOURSELF - OR THAT YOU ARE A FAILURE OR HAVE LET YOURSELF OR YOUR FAMILY DOWN: NOT AT ALL
4. FEELING TIRED OR HAVING LITTLE ENERGY: NOT AT ALL
SUM OF ALL RESPONSES TO PHQ QUESTIONS 1-9: 0
SUM OF ALL RESPONSES TO PHQ QUESTIONS 1-9: 0
3. TROUBLE FALLING OR STAYING ASLEEP: NOT AT ALL
SUM OF ALL RESPONSES TO PHQ QUESTIONS 1-9: 0
8. MOVING OR SPEAKING SO SLOWLY THAT OTHER PEOPLE COULD HAVE NOTICED. OR THE OPPOSITE - BEING SO FIDGETY OR RESTLESS THAT YOU HAVE BEEN MOVING AROUND A LOT MORE THAN USUAL: NOT AT ALL
2. FEELING DOWN, DEPRESSED OR HOPELESS: NOT AT ALL
7. TROUBLE CONCENTRATING ON THINGS, SUCH AS READING THE NEWSPAPER OR WATCHING TELEVISION: NOT AT ALL
SUM OF ALL RESPONSES TO PHQ QUESTIONS 1-9: 0
5. POOR APPETITE OR OVEREATING: NOT AT ALL

## 2025-05-20 ASSESSMENT — ENCOUNTER SYMPTOMS: RESPIRATORY NEGATIVE: 1

## 2025-05-20 NOTE — PROGRESS NOTES
PROGRESS NOTE    SUBJECTIVE:   Pooja Gamez is a 64 y.o. female seen for a follow up visit regarding   Chief Complaint   Patient presents with    Follow-up Chronic Condition     Discuss Neck Pain.       GAPS: None         HPI:  Here today for follow-up of chronic problems, today she reports has been having some neck issues, has been going to her physical therapist and getting some relief.  She has a history of fusion in the past.  She also has a couple skin lesions on her left wrist that are irritating her.         Reviewed and updated this visit by provider:  Tobacco  Allergies  Meds  Problems  Med Hx  Surg Hx  Fam Hx           Review of Systems   Respiratory: Negative.     Cardiovascular: Negative.           OBJECTIVE:  Vitals:    05/20/25 1500   BP: 128/80   Resp: 16   Weight: 64.5 kg (142 lb 3.2 oz)   Height: 1.632 m (5' 4.25\")        Physical Exam   General: Alert and oriented x3, well-appearing  Neck: No adenopathy, thyromegaly or thyroid nodules  Pulmonary: Normal effort, good airflow, no rales or rhonchi  CVS: Regular rate and rhythm, normal S1, S2, no S3 or S4, no murmurs; no carotid bruits, 2+ pedal pulses  Skin: The dorsal aspect of the left wrist demonstrates 2 small irritated Ben K's.  Musculoskeletal: Decreased range of motion cervical spine with lateral bending and rotation.  C5-C8 motor exam unremarkable.    Medical problems and test results were reviewed with the patient today.     No results found for this or any previous visit (from the past 4 weeks).    No results found for any visits on 05/20/25.     ASSESSMENT and PLAN    1. Inflamed seborrheic keratosis  2. Repetitive strain injury of cervical spine, subsequent encounter  3. History of fusion of cervical spine  4. Mild episode of recurrent major depressive disorder     Continues physical therapy for her neck, she may need a steroid Dosepak if she does not respond at all she seems to be doing better.  Single freeze thaw

## 2025-06-13 DIAGNOSIS — F32.0 MAJOR DEPRESSIVE DISORDER, SINGLE EPISODE, MILD: ICD-10-CM

## 2025-06-13 DIAGNOSIS — E78.5 DYSLIPIDEMIA: ICD-10-CM

## 2025-06-13 RX ORDER — ROSUVASTATIN CALCIUM 10 MG/1
10 TABLET, COATED ORAL NIGHTLY
Qty: 90 TABLET | Refills: 3 | OUTPATIENT
Start: 2025-06-13

## 2025-06-13 RX ORDER — ESCITALOPRAM OXALATE 20 MG/1
20 TABLET ORAL DAILY
Qty: 90 TABLET | Refills: 3 | OUTPATIENT
Start: 2025-06-13

## 2025-06-29 SDOH — HEALTH STABILITY: PHYSICAL HEALTH: ON AVERAGE, HOW MANY MINUTES DO YOU ENGAGE IN EXERCISE AT THIS LEVEL?: 60 MIN

## 2025-06-29 SDOH — HEALTH STABILITY: PHYSICAL HEALTH: ON AVERAGE, HOW MANY DAYS PER WEEK DO YOU ENGAGE IN MODERATE TO STRENUOUS EXERCISE (LIKE A BRISK WALK)?: 3 DAYS

## 2025-06-29 ASSESSMENT — PATIENT HEALTH QUESTIONNAIRE - PHQ9
SUM OF ALL RESPONSES TO PHQ9 QUESTIONS 1 & 2: 0
2. FEELING DOWN, DEPRESSED OR HOPELESS: NOT AT ALL
SUM OF ALL RESPONSES TO PHQ QUESTIONS 1-9: 0
1. LITTLE INTEREST OR PLEASURE IN DOING THINGS: NOT AT ALL
SUM OF ALL RESPONSES TO PHQ QUESTIONS 1-9: 0
1. LITTLE INTEREST OR PLEASURE IN DOING THINGS: NOT AT ALL
2. FEELING DOWN, DEPRESSED OR HOPELESS: NOT AT ALL
SUM OF ALL RESPONSES TO PHQ QUESTIONS 1-9: 0
SUM OF ALL RESPONSES TO PHQ QUESTIONS 1-9: 0

## 2025-07-02 ENCOUNTER — OFFICE VISIT (OUTPATIENT)
Dept: INTERNAL MEDICINE CLINIC | Facility: CLINIC | Age: 65
End: 2025-07-02
Payer: COMMERCIAL

## 2025-07-02 VITALS
HEART RATE: 88 BPM | RESPIRATION RATE: 16 BRPM | BODY MASS INDEX: 23.9 KG/M2 | SYSTOLIC BLOOD PRESSURE: 108 MMHG | DIASTOLIC BLOOD PRESSURE: 68 MMHG | OXYGEN SATURATION: 98 % | HEIGHT: 64 IN | WEIGHT: 140 LBS

## 2025-07-02 DIAGNOSIS — E78.2 MIXED HYPERLIPIDEMIA: Primary | ICD-10-CM

## 2025-07-02 DIAGNOSIS — E78.5 DYSLIPIDEMIA: Chronic | ICD-10-CM

## 2025-07-02 DIAGNOSIS — F32.0 MAJOR DEPRESSIVE DISORDER, SINGLE EPISODE, MILD: ICD-10-CM

## 2025-07-02 DIAGNOSIS — F41.8 DEPRESSION WITH ANXIETY: ICD-10-CM

## 2025-07-02 PROCEDURE — 99214 OFFICE O/P EST MOD 30 MIN: CPT | Performed by: FAMILY MEDICINE

## 2025-07-02 RX ORDER — ESCITALOPRAM OXALATE 20 MG/1
20 TABLET ORAL DAILY
Qty: 90 TABLET | Refills: 3 | Status: SHIPPED | OUTPATIENT
Start: 2025-07-02

## 2025-07-02 SDOH — ECONOMIC STABILITY: FOOD INSECURITY: WITHIN THE PAST 12 MONTHS, THE FOOD YOU BOUGHT JUST DIDN'T LAST AND YOU DIDN'T HAVE MONEY TO GET MORE.: NEVER TRUE

## 2025-07-02 SDOH — ECONOMIC STABILITY: FOOD INSECURITY: WITHIN THE PAST 12 MONTHS, YOU WORRIED THAT YOUR FOOD WOULD RUN OUT BEFORE YOU GOT MONEY TO BUY MORE.: NEVER TRUE

## 2025-07-02 NOTE — PROGRESS NOTES
Mau Karimi DO  Diplomate of the [employer] of [employer]  [facility]    Pooja Gamez (: 1960) is a 65 y.o. female, here for evaluation of the following chief complaint(s):  New Patient (Needs medication refills. )     Assessment & Plan  - Hyperlipidemia.    - On rosuvastatin, wants to discontinue.    - Order coronary calcium scan to assess cardiovascular risk.    - Consider discontinuation if coronary calcium score is zero.    - Depression with anxiety.    - On Lexapro 20 mg, managing symptoms well.    - Continue Lexapro; prescription sent to pharmacy.    - Post-surgical status.    - Post plantar fasciitis surgery; improved but still numbness and swelling.     Lab scheduled for next week.    Past Medical History, Surgical History, Social History, Family History, Medications, and Allergies all reviewed.    1. Mixed hyperlipidemia  -     Basic Metabolic Panel; Future  -     Hepatic Function Panel; Future  -     Lipid Panel; Future  -     CBC; Future  -     CT CARDIAC CALCIUM SCORING; Future  2. Dyslipidemia  3. Depression with anxiety  4. Major depressive disorder, single episode, mild  -     escitalopram (LEXAPRO) 20 MG tablet; Take 1 tablet by mouth daily, Disp-90 tablet, R-3Normal    History of Present Illness  The patient presents for a new patient visit.  Previous PCP left.    Depression and anxiety  -Doing well on lexapro, moods stable.    Mixed hyperlipidemia  - She is on rosuvastatin and wants to discontinue it.  - She is taking Lexapro for depression and anxiety, started during marital stress, doing well.  Moods stable.    Exercise and Weight Management  - She has not exercised due to foot pain but would like to lose 20 pounds.  - She participated in bodybuilding competitions 4 years ago.    Breast Health  - Mammogram was done in 2025.    Gynecological Health  - UTD on pap    Foot Pain  - She had plantar fasciitis surgery recently, with improvement but

## 2025-07-11 ENCOUNTER — LAB (OUTPATIENT)
Dept: INTERNAL MEDICINE CLINIC | Facility: CLINIC | Age: 65
End: 2025-07-11

## 2025-07-11 DIAGNOSIS — E78.2 MIXED HYPERLIPIDEMIA: ICD-10-CM

## 2025-07-11 LAB
ALBUMIN SERPL-MCNC: 4.1 G/DL (ref 3.2–4.6)
ALBUMIN/GLOB SERPL: 1.4 (ref 1–1.9)
ALP SERPL-CCNC: 51 U/L (ref 35–104)
ALT SERPL-CCNC: 44 U/L (ref 8–45)
ANION GAP SERPL CALC-SCNC: 12 MMOL/L (ref 7–16)
AST SERPL-CCNC: 38 U/L (ref 15–37)
BILIRUB DIRECT SERPL-MCNC: 0.3 MG/DL (ref 0–0.3)
BILIRUB SERPL-MCNC: 0.6 MG/DL (ref 0–1.2)
BUN SERPL-MCNC: 11 MG/DL (ref 8–23)
CALCIUM SERPL-MCNC: 9.7 MG/DL (ref 8.8–10.2)
CHLORIDE SERPL-SCNC: 101 MMOL/L (ref 98–107)
CHOLEST SERPL-MCNC: 170 MG/DL (ref 0–200)
CO2 SERPL-SCNC: 26 MMOL/L (ref 20–29)
CREAT SERPL-MCNC: 0.79 MG/DL (ref 0.6–1.1)
ERYTHROCYTE [DISTWIDTH] IN BLOOD BY AUTOMATED COUNT: 12.1 % (ref 11.9–14.6)
GLOBULIN SER CALC-MCNC: 2.9 G/DL (ref 2.3–3.5)
GLUCOSE SERPL-MCNC: 97 MG/DL (ref 70–99)
HCT VFR BLD AUTO: 37.3 % (ref 35.8–46.3)
HDLC SERPL-MCNC: 65 MG/DL (ref 40–60)
HDLC SERPL: 2.6 (ref 0–5)
HGB BLD-MCNC: 12.8 G/DL (ref 11.7–15.4)
LDLC SERPL CALC-MCNC: 95 MG/DL (ref 0–100)
MCH RBC QN AUTO: 31.2 PG (ref 26.1–32.9)
MCHC RBC AUTO-ENTMCNC: 34.3 G/DL (ref 31.4–35)
MCV RBC AUTO: 91 FL (ref 82–102)
NRBC # BLD: 0 K/UL (ref 0–0.2)
PLATELET # BLD AUTO: 216 K/UL (ref 150–450)
PMV BLD AUTO: 10 FL (ref 9.4–12.3)
POTASSIUM SERPL-SCNC: 4.2 MMOL/L (ref 3.5–5.1)
PROT SERPL-MCNC: 7 G/DL (ref 6.3–8.2)
RBC # BLD AUTO: 4.1 M/UL (ref 4.05–5.2)
SODIUM SERPL-SCNC: 139 MMOL/L (ref 136–145)
TRIGL SERPL-MCNC: 53 MG/DL (ref 0–150)
VLDLC SERPL CALC-MCNC: 11 MG/DL (ref 6–23)
WBC # BLD AUTO: 3.5 K/UL (ref 4.3–11.1)

## 2025-07-14 DIAGNOSIS — R74.8 ELEVATED LIVER ENZYMES: ICD-10-CM

## 2025-07-14 DIAGNOSIS — D70.9 NEUTROPENIA, UNSPECIFIED TYPE: Primary | ICD-10-CM

## 2025-07-24 ENCOUNTER — HOSPITAL ENCOUNTER (OUTPATIENT)
Dept: CT IMAGING | Age: 65
Discharge: HOME | End: 2025-07-26
Attending: FAMILY MEDICINE

## 2025-07-24 DIAGNOSIS — E78.2 MIXED HYPERLIPIDEMIA: ICD-10-CM

## 2025-07-24 PROCEDURE — 75571 CT HRT W/O DYE W/CA TEST: CPT

## 2025-09-04 ENCOUNTER — TELEPHONE (OUTPATIENT)
Dept: INTERNAL MEDICINE CLINIC | Facility: CLINIC | Age: 65
End: 2025-09-04

## (undated) DEVICE — BANDAGE,ELASTIC,ESMARK,STERILE,4"X9',LF: Brand: MEDLINE

## (undated) DEVICE — SYRINGE MED 30ML STD CLR PLAS LUERLOCK TIP N CTRL DISP

## (undated) DEVICE — SYR 5ML 1/5 GRAD LL NSAF LF --

## (undated) DEVICE — GOWN,SIRUS,NONRNF,SETINSLV,XL,20/CS: Brand: MEDLINE

## (undated) DEVICE — PAD,ABDOMINAL,5"X9",ST,LF,25/BX: Brand: MEDLINE INDUSTRIES, INC.

## (undated) DEVICE — GLOVE SURG SZ 65 L12IN FNGR THK79MIL GRN LTX FREE

## (undated) DEVICE — NEEDLE HYPO 25GA L1.5IN BVL ORIENTED ECLIPSE

## (undated) DEVICE — SUTURE PERMAHAND SZ 2-0 L12X18IN NONABSORBABLE BLK SILK A185H

## (undated) DEVICE — NEEDLE HYPO 18GA L1.5IN PNK S STL HUB POLYPR SHLD REG BVL

## (undated) DEVICE — STRETCH BANDAGE ROLL: Brand: DERMACEA

## (undated) DEVICE — ZIMMER® STERILE DISPOSABLE TOURNIQUET CUFF WITH PLC, DUAL PORT, SINGLE BLADDER, 18 IN. (46 CM)

## (undated) DEVICE — GOWN,ECLIPSE,POLYRNF,BRTHSLV,XL,30/CS: Brand: MEDLINE

## (undated) DEVICE — BANDAGE,GAUZE,CONFORMING,3"X75",STRL,LF: Brand: MEDLINE

## (undated) DEVICE — OCCLUSIVE GAUZE STRIP,3% BISMUTH TRIBROMOPHENATE IN PETROLATUM BLEND: Brand: XEROFORM

## (undated) DEVICE — SURGICAL PROCEDURE PACK BASIC ST FRANCIS

## (undated) DEVICE — DRAPE TWL SURG 16X26IN BLU ORB04] ALLCARE INC]

## (undated) DEVICE — SOLUTION IV 1000ML 0.9% SOD CHL

## (undated) DEVICE — ABDOMINAL PAD: Brand: DERMACEA

## (undated) DEVICE — KENDALL RADIOLUCENT FOAM MONITORING ELECTRODE RECTANGULAR SHAPE: Brand: KENDALL

## (undated) DEVICE — AMD ANTIMICROBIAL GAUZE SPONGES,12 PLY USP TYPE VII, 0.2% POLYHEXAMETHYLENE BIGUANIDE HCI (PHMB): Brand: CURITY

## (undated) DEVICE — 2000CC GUARDIAN II: Brand: GUARDIAN

## (undated) DEVICE — FOOT ANKLE PACK: Brand: MEDLINE INDUSTRIES, INC.

## (undated) DEVICE — CONNECTOR TBNG OD5-7MM O2 END DISP

## (undated) DEVICE — BANDAGE COBAN 6 IN WND 6INX5YD FOAM

## (undated) DEVICE — BANDAGE GZ W2XL75IN ST RAYON POLY CNFRM STRTCH LTWT

## (undated) DEVICE — NDL PRT INJ NSAF BLNT 18GX1.5 --

## (undated) DEVICE — SYR 3ML LL TIP 1/10ML GRAD --

## (undated) DEVICE — GLOVE SURG SZ 8 L12IN FNGR THK79MIL GRN LTX FREE

## (undated) DEVICE — SLIM BODY SKIN STAPLER: Brand: APPOSE ULC

## (undated) DEVICE — SUT CHRMC 4-0 27IN SH BRN --

## (undated) DEVICE — DRESSING PETRO W3XL8IN OIL EMUL N ADH GZ KNIT IMPREG CELOS

## (undated) DEVICE — GLOVE SURG SZ 65 CRM LTX FREE POLYISOPRENE POLYMER BEAD ANTI

## (undated) DEVICE — CONTAINER SPEC FRMLN 120ML --

## (undated) DEVICE — 3M™ TEGADERM™ TRANSPARENT FILM DRESSING FRAME STYLE, 1626W, 4 IN X 4-3/4 IN (10 CM X 12 CM), 50/CT 4CT/CASE: Brand: 3M™ TEGADERM™

## (undated) DEVICE — TRAY CATH 16F URIN MTR LTX -- CONVERT TO ITEM 363111

## (undated) DEVICE — SUTURE VCRL SZ 3-0 L18IN ABSRB UD L26MM SH 1/2 CIR J864D

## (undated) DEVICE — (D)PREP SKN CHLRAPRP APPL 26ML -- CONVERT TO ITEM 371833

## (undated) DEVICE — SOLUTION IRRIG 1000ML 0.9% SOD CHL USP POUR PLAS BTL

## (undated) DEVICE — DRAPE,HAND,STERILE: Brand: MEDLINE

## (undated) DEVICE — BANDAGE COMPR L5YDXW2IN FOAM CO FLX

## (undated) DEVICE — PREP SKN CHLRAPRP APL 26ML STR --

## (undated) DEVICE — PADDING CAST W2INXL4YD ST COT COHESIVE HND TEARABLE SPEC

## (undated) DEVICE — SUTURE PDS II SZ 1 L96IN ABSRB VLT TP-1 L65MM 1/2 CIR Z880G

## (undated) DEVICE — TOTE STRK HAND GENERIC --

## (undated) DEVICE — DERMABOND SKIN ADH 0.7ML -- DERMABOND ADVANCED 12/BX

## (undated) DEVICE — SPONGE LAP 18X18IN STRL -- 5/PK

## (undated) DEVICE — BLADE ELECTRODE: Brand: EDGE

## (undated) DEVICE — REM POLYHESIVE ADULT PATIENT RETURN ELECTRODE: Brand: VALLEYLAB

## (undated) DEVICE — DRAPE, FILM SHEET, 44X65 STERILE: Brand: MEDLINE

## (undated) DEVICE — SPLINT THMB W4XL30IN FBRGLS PD PRECUT LTWT DURABLE FAST SET

## (undated) DEVICE — SUTURE ETHLN SZ 4-0 L18IN NONABSORBABLE BLK L19MM PS-2 3/8 1667H

## (undated) DEVICE — KENDALL SCD EXPRESS SLEEVES, KNEE LENGTH, MEDIUM: Brand: KENDALL SCD

## (undated) DEVICE — APPLICATOR MEDICATED 26 CC SOLUTION HI LT ORNG CHLORAPREP

## (undated) DEVICE — SHEET, T, LAPAROTOMY, STERILE: Brand: MEDLINE

## (undated) DEVICE — T-DRAPE,EXTREMITY,STERILE: Brand: MEDLINE

## (undated) DEVICE — CANNULA NSL ORAL AD FOR CAPNOFLEX CO2 O2 AIRLFE